# Patient Record
Sex: MALE | Race: WHITE | NOT HISPANIC OR LATINO | Employment: FULL TIME | URBAN - METROPOLITAN AREA
[De-identification: names, ages, dates, MRNs, and addresses within clinical notes are randomized per-mention and may not be internally consistent; named-entity substitution may affect disease eponyms.]

---

## 2017-02-17 ENCOUNTER — GENERIC CONVERSION - ENCOUNTER (OUTPATIENT)
Dept: OTHER | Facility: OTHER | Age: 52
End: 2017-02-17

## 2017-03-10 ENCOUNTER — ALLSCRIPTS OFFICE VISIT (OUTPATIENT)
Dept: OTHER | Facility: OTHER | Age: 52
End: 2017-03-10

## 2017-03-15 ENCOUNTER — GENERIC CONVERSION - ENCOUNTER (OUTPATIENT)
Dept: OTHER | Facility: OTHER | Age: 52
End: 2017-03-15

## 2017-03-15 RX ORDER — MULTIVITAMIN
1 CAPSULE ORAL DAILY
COMMUNITY

## 2017-03-16 LAB
ENDOMYSIAL IGA ANTIBODY (HISTORICAL): NEGATIVE
IGA (HISTORICAL): 367 MG/DL (ref 90–386)
TTG IGA (HISTORICAL): <2 U/ML (ref 0–3)

## 2017-03-17 LAB — H PYLORI ANTIGEN STOOL (HISTORICAL): NEGATIVE

## 2017-03-19 ENCOUNTER — ANESTHESIA EVENT (OUTPATIENT)
Dept: PERIOP | Facility: HOSPITAL | Age: 52
End: 2017-03-19
Payer: COMMERCIAL

## 2017-03-20 ENCOUNTER — HOSPITAL ENCOUNTER (OUTPATIENT)
Facility: HOSPITAL | Age: 52
Setting detail: OUTPATIENT SURGERY
Discharge: HOME | End: 2017-03-20
Attending: INTERNAL MEDICINE | Admitting: INTERNAL MEDICINE
Payer: COMMERCIAL

## 2017-03-20 ENCOUNTER — GENERIC CONVERSION - ENCOUNTER (OUTPATIENT)
Dept: OTHER | Facility: OTHER | Age: 52
End: 2017-03-20

## 2017-03-20 ENCOUNTER — ANESTHESIA (OUTPATIENT)
Dept: PERIOP | Facility: HOSPITAL | Age: 52
End: 2017-03-20
Payer: COMMERCIAL

## 2017-03-20 VITALS
WEIGHT: 209.44 LBS | SYSTOLIC BLOOD PRESSURE: 135 MMHG | HEART RATE: 76 BPM | TEMPERATURE: 97.6 F | DIASTOLIC BLOOD PRESSURE: 93 MMHG | OXYGEN SATURATION: 100 % | RESPIRATION RATE: 16 BRPM | BODY MASS INDEX: 28.37 KG/M2 | HEIGHT: 72 IN

## 2017-03-20 DIAGNOSIS — R10.9 ABDOMINAL PAIN: ICD-10-CM

## 2017-03-20 DIAGNOSIS — R14.0 ABDOMINAL BLOATING: ICD-10-CM

## 2017-03-20 DIAGNOSIS — R19.5 PASSAGE OF LOOSE STOOLS: ICD-10-CM

## 2017-03-20 DIAGNOSIS — K21.9 CHRONIC GERD: ICD-10-CM

## 2017-03-20 DIAGNOSIS — Z86.010 HISTORY OF COLONIC POLYPS: ICD-10-CM

## 2017-03-20 PROCEDURE — 88305 TISSUE EXAM BY PATHOLOGIST: CPT | Performed by: INTERNAL MEDICINE

## 2017-03-20 RX ORDER — PROPOFOL 10 MG/ML
INJECTION, EMULSION INTRAVENOUS AS NEEDED
Status: DISCONTINUED | OUTPATIENT
Start: 2017-03-20 | End: 2017-03-20 | Stop reason: SURG

## 2017-03-20 RX ORDER — PANTOPRAZOLE SODIUM 40 MG/1
40 TABLET, DELAYED RELEASE ORAL DAILY
COMMUNITY
End: 2018-04-06 | Stop reason: SDUPTHER

## 2017-03-20 RX ORDER — SODIUM CHLORIDE, SODIUM LACTATE, POTASSIUM CHLORIDE, CALCIUM CHLORIDE 600; 310; 30; 20 MG/100ML; MG/100ML; MG/100ML; MG/100ML
125 INJECTION, SOLUTION INTRAVENOUS CONTINUOUS
Status: DISCONTINUED | OUTPATIENT
Start: 2017-03-20 | End: 2017-03-20 | Stop reason: HOSPADM

## 2017-03-20 RX ADMIN — PROPOFOL 100 MG: 10 INJECTION, EMULSION INTRAVENOUS at 09:44

## 2017-03-20 RX ADMIN — PROPOFOL 50 MG: 10 INJECTION, EMULSION INTRAVENOUS at 09:48

## 2017-03-20 RX ADMIN — SODIUM CHLORIDE, SODIUM LACTATE, POTASSIUM CHLORIDE, AND CALCIUM CHLORIDE 125 ML/HR: .6; .31; .03; .02 INJECTION, SOLUTION INTRAVENOUS at 09:19

## 2018-01-11 NOTE — CONSULTS
I had the pleasure of evaluating your patient, Shiraz Fairchild  My full evaluation follows:      Chief Complaint  Abdominal discomfort      History of Present Illness  66-year-old male who has a long history of abdominal discomfort  He underwent EGD and colonoscopy in 2007 and was found to have gastritis  The patient also was apparently diagnosed with Helicobacter  He was treated but had apparently a recurrence  He is been on several regimens  He does not recall what they are  He also started having significant loose stool  He was placed on viberzi but did not feel it was helping and stopped taking it  His main problem is he has a bowel movement in the morning and then has urgency at night with abdominal bloating  He has not had any blood in the stool  Colonoscopy 3 years ago revealed colon polyps  He had an EGD 1 year ago but does not recall the results  He has no weight loss fever or chills  No dysphagia  He has a history of chronic GERD and was on pantoprazole for many years  His previous physician did not renew this  He is been taking over-the-counter medications and basically is asymptomatic from this point of view  He has no other exacerbating or remitting factor for his problems except possibly for stress  He works as a   He is actually been treated for stress with Xanax by psychiatrist in the past  He has no weight loss  No melanotic stool  No rectal bleeding  No nocturnal symptomatology  No exertional symptomatology  No relationship between food and his symptomatology  He does not take in a lot of lactose  No family history of any GI disease  No other complaints at the present time  Active Problems    1  Abdominal pain (789 00) (R10 9)    Past Medical History    The active problems and past medical history were reviewed and updated today  Surgical History    · History of Knee Surgery    The surgical history was reviewed and updated today         Family History    · Family history of coronary artery disease (V17 3) (Z82 49)    · Family history of malignant neoplasm of cervix (V16 49) (Z80 49)    The family history was reviewed and updated today  Social History    · Current some day smoker (305 1) (F17 200)   · Social alcohol use (Z78 9)  The social history was reviewed and updated today  The social history was reviewed and is unchanged  Current Meds   1  Multivitamin TABS; Therapy: (Recorded:10Mar2017) to Recorded    Allergies    1  No Known Drug Allergies    Vitals   Recorded: [id number] 97:19TE   Systolic 876   Diastolic 80   Height 5 ft 10 in   Weight 210 lb    BMI Calculated 30 13   BSA Calculated 2 13     Physical Exam    Constitutional   General appearance: No acute distress, well appearing and well nourished  Eyes   Conjunctiva and lids: No swelling, erythema, or discharge  Pupils and irises: Equal, round and reactive to light  Ears, Nose, Mouth, and Throat   External inspection of ears and nose: Normal     Nasal mucosa, septum, and turbinates: Normal without edema or erythema  Oropharynx: Normal with no erythema, edema, exudate or lesions  Pulmonary   Respiratory effort: No increased work of breathing or signs of respiratory distress  Auscultation of lungs: Clear to auscultation, equal breath sounds bilaterally, no wheezes, no rales, no rhonci  Cardiovascular   Palpation of heart: Normal PMI, no thrills  Auscultation of heart: Normal rate and rhythm, normal S1 and S2, without murmurs  Examination of extremities for edema and/or varicosities: Normal     Carotid pulses: Normal     Abdomen   Abdomen: Non-tender, no masses  Liver and spleen: No hepatomegaly or splenomegaly  Lymphatic   Palpation of lymph nodes in neck: No lymphadenopathy  Musculoskeletal   Gait and station: Normal     Digits and nails: Normal without clubbing or cyanosis      Inspection/palpation of joints, bones, and muscles: Normal     Skin   Skin and subcutaneous tissue: Normal without rashes or lesions  Neurologic No nystagmus or asterixis  Psychiatric   Orientation to person, place and time: Normal     Mood and affect: Normal          Assessment    1  Abdominal pain (789 00) (R10 9)   2  Abdominal bloating (787 3) (R14 0)   3  Chronic GERD (530 81) (K21 9)   4  History of colon polyps (V12 72) (Z86 010)   5  Passage of loose stools (787 91) (R19 7)    Plan  Abdominal bloating    · (1) CELIAC DISEASE AB PROFILE; Status:Active; Requested for:10Mar2017;    Perform:[facility]; Due:10Mar2018; Ordered; For:Abdominal bloating; Ordered By:Maycol Weathers;  Abdominal bloating, Abdominal pain    · (LC) H  pylori Stool Ag, EIA; Status:Active; Requested for:10Mar2017;    Perform:[facility]; Due:10Mar2018; Ordered; For:Abdominal bloating, Abdominal pain; Ordered By:Maycol Weathers;  Abdominal bloating, Abdominal pain, History of colon polyps, Passage of loose stools    · ENDOSCOPY - PROCEDURE, RISKS, AND BENEFITS; Status:Complete;   Done:  [id number]   Ordered; For:Abdominal bloating, Abdominal pain, History of colon polyps, Passage of loose stools; Ordered By:Maycol Weathers;  Abdominal bloating, Passage of loose stools    · COLONOSCOPY; Status:Active; Requested for:10Mar2017;    Perform:[facility]; Due:10Mar2018; Ordered; For:Abdominal bloating, Passage of loose stools; Ordered By:Maycol Weathers;  Chronic GERD    · Pantoprazole Sodium 40 MG Oral Tablet Delayed Release; take 1 tablet by mouth  every day   Rx By: Vishal Lebron; Dispense: 30 Days ; #:30 Tablet Delayed Release; Refill: 5; For: Chronic GERD; INDRA = N; Verified Transmission to [facility] #[id number]; Last Updated By: System, SureScript[employer]; 3/10/2017 8:45:39 AM    Discussion/Summary    Problem #1, chronic GERD, history of H  pylori which may have been refractory  Plan, stool for H  pylori antigen  Renew Protonix  Problem #2, altered bowel habits with abdominal bloating, loose stool, history of colon polyps   Plan, celiac panel  Repeat colonoscopy  Last colonoscopy 3 years ago  Problem #3, possible IBS  Patient is on Xanax for stress  He takes this when necessary  We will have him take it nightly for 5 nights to see if it has any effect on his bowel habits and abdominal bloating  Thank you very much for allowing me to participate in the care of this patient  If you have any questions, please do not hesitate to contact me        Signatures   Electronically signed by : RONN Nogueira ; Mar 10 2017  8:49AM EST                       (Author)

## 2018-01-12 NOTE — CONSULTS
Assessment    1  Abdominal pain (789 00) (R10 9)   2  Abdominal bloating (787 3) (R14 0)   3  Chronic GERD (530 81) (K21 9)   4  History of colon polyps (V12 72) (Z86 010)   5  Passage of loose stools (787 91) (R19 7)    Plan  Abdominal bloating    · (1) CELIAC DISEASE AB PROFILE; Status:Active; Requested for:10Mar2017;    Perform:[facility]; Due:10Mar2018; Ordered; For:Abdominal bloating; Ordered By:Maycol Weathers;  Abdominal bloating, Abdominal pain    · (LC) H  pylori Stool Ag, EIA; Status:Active; Requested for:10Mar2017;    Perform:[facility]; Due:10Mar2018; Ordered; For:Abdominal bloating, Abdominal pain; Ordered By:Maycol Weathers;  Abdominal bloating, Abdominal pain, History of colon polyps, Passage of loose stools    · ENDOSCOPY - PROCEDURE, RISKS, AND BENEFITS; Status:Complete;   Done:  [id number]   Ordered; For:Abdominal bloating, Abdominal pain, History of colon polyps, Passage of loose stools; Ordered By:Maycol Weathers;  Abdominal bloating, Passage of loose stools    · COLONOSCOPY; Status:Active; Requested for:10Mar2017;    Perform:[facility]; Due:10Mar2018; Ordered; For:Abdominal bloating, Passage of loose stools; Ordered By:Maycol Weathers;  Chronic GERD    · Pantoprazole Sodium 40 MG Oral Tablet Delayed Release; take 1 tablet by mouth  every day   Rx By: Larry Gan; Dispense: 30 Days ; #:30 Tablet Delayed Release; Refill: 5; For: Chronic GERD; INDRA = N; Verified Transmission to [facility]ARM[facility] #[id number]; Last Updated By: System, SureScripts; 3/10/2017 8:45:39 AM    Discussion/Summary  Discussion Summary:   Problem #1, chronic GERD, history of H  pylori which may have been refractory  Plan, stool for H  pylori antigen  Renew Protonix  Problem #2, altered bowel habits with abdominal bloating, loose stool, history of colon polyps  Plan, celiac panel  Repeat colonoscopy  Last colonoscopy 3 years ago  Problem #3, possible IBS  Patient is on Xanax for stress  He takes this when necessary  We will have him take it nightly for 5 nights to see if it has any effect on his bowel habits and abdominal bloating  Chief Complaint  Chief Complaint Free Text Note Form: Abdominal discomfort      History of Present Illness  HPI: 22-year-old male who has a long history of abdominal discomfort  He underwent EGD and colonoscopy in 2007 and was found to have gastritis  The patient also was apparently diagnosed with Helicobacter  He was treated but had apparently a recurrence  He is been on several regimens  He does not recall what they are  He also started having significant loose stool  He was placed on viberzi but did not feel it was helping and stopped taking it  His main problem is he has a bowel movement in the morning and then has urgency at night with abdominal bloating  He has not had any blood in the stool  Colonoscopy 3 years ago revealed colon polyps  He had an EGD 1 year ago but does not recall the results  He has no weight loss fever or chills  No dysphagia  He has a history of chronic GERD and was on pantoprazole for many years  His previous physician did not renew this  He is been taking over-the-counter medications and basically is asymptomatic from this point of view  He has no other exacerbating or remitting factor for his problems except possibly for stress  He works as a   He is actually been treated for stress with Xanax by psychiatrist in the past  He has no weight loss  No melanotic stool  No rectal bleeding  No nocturnal symptomatology  No exertional symptomatology  No relationship between food and his symptomatology  He does not take in a lot of lactose  No family history of any GI disease  No other complaints at the present time  Active Problems    1  Abdominal pain (789 00) (R10 9)    Past Medical History  Active Problems And Past Medical History Reviewed: The active problems and past medical history were reviewed and updated today  Surgical History    1   History of Knee Surgery  Surgical History Reviewed: The surgical history was reviewed and updated today  Family History  Father    1  Family history of coronary artery disease (V17 3) (Z82 49)  Sister    2  Family history of malignant neoplasm of cervix (V16 49) (Z80 49)  Family History Reviewed: The family history was reviewed and updated today  Social History    · Current some day smoker (305 1) (F17 200)   · Social alcohol use (Z78 9)  Social History Reviewed: The social history was reviewed and updated today  The social history was reviewed and is unchanged  Current Meds   1  Multivitamin TABS; Therapy: (Recorded:10Mar2017) to Recorded    Allergies    1  No Known Drug Allergies    Vitals  Vital Signs    Recorded: [id number] 60:68LP   Systolic 990   Diastolic 80   Height 5 ft 10 in   Weight 210 lb    BMI Calculated 30 13   BSA Calculated 2 13     Physical Exam    Constitutional   General appearance: No acute distress, well appearing and well nourished  Eyes   Conjunctiva and lids: No swelling, erythema, or discharge  Pupils and irises: Equal, round and reactive to light  Ears, Nose, Mouth, and Throat   External inspection of ears and nose: Normal     Nasal mucosa, septum, and turbinates: Normal without edema or erythema  Oropharynx: Normal with no erythema, edema, exudate or lesions  Pulmonary   Respiratory effort: No increased work of breathing or signs of respiratory distress  Auscultation of lungs: Clear to auscultation, equal breath sounds bilaterally, no wheezes, no rales, no rhonci  Cardiovascular   Palpation of heart: Normal PMI, no thrills  Auscultation of heart: Normal rate and rhythm, normal S1 and S2, without murmurs  Examination of extremities for edema and/or varicosities: Normal     Carotid pulses: Normal     Abdomen   Abdomen: Non-tender, no masses  Liver and spleen: No hepatomegaly or splenomegaly      Lymphatic   Palpation of lymph nodes in neck: No lymphadenopathy  Musculoskeletal   Gait and station: Normal     Digits and nails: Normal without clubbing or cyanosis  Inspection/palpation of joints, bones, and muscles: Normal     Skin   Skin and subcutaneous tissue: Normal without rashes or lesions  Neurologic No nystagmus or asterixis     Psychiatric   Orientation to person, place and time: Normal     Mood and affect: Normal          Signatures   Electronically signed by : RONN Stubbs ; Mar 10 2017  8:49AM EST                       (Author)

## 2018-01-14 VITALS
BODY MASS INDEX: 30.06 KG/M2 | SYSTOLIC BLOOD PRESSURE: 140 MMHG | HEIGHT: 70 IN | DIASTOLIC BLOOD PRESSURE: 80 MMHG | WEIGHT: 210 LBS

## 2018-04-06 DIAGNOSIS — K21.9 GASTROESOPHAGEAL REFLUX DISEASE WITHOUT ESOPHAGITIS: Primary | ICD-10-CM

## 2018-04-06 RX ORDER — PANTOPRAZOLE SODIUM 40 MG/1
40 TABLET, DELAYED RELEASE ORAL DAILY
Qty: 30 TABLET | Refills: 0 | Status: SHIPPED | OUTPATIENT
Start: 2018-04-06 | End: 2018-04-13

## 2018-04-06 RX ORDER — PANTOPRAZOLE SODIUM 40 MG/1
TABLET, DELAYED RELEASE ORAL
Qty: 30 TABLET | Refills: 5 | OUTPATIENT
Start: 2018-04-06

## 2018-04-10 ENCOUNTER — OFFICE VISIT (OUTPATIENT)
Dept: UROLOGY | Facility: MEDICAL CENTER | Age: 53
End: 2018-04-10
Payer: COMMERCIAL

## 2018-04-10 VITALS
WEIGHT: 193.8 LBS | BODY MASS INDEX: 26.25 KG/M2 | SYSTOLIC BLOOD PRESSURE: 146 MMHG | DIASTOLIC BLOOD PRESSURE: 84 MMHG | HEIGHT: 72 IN

## 2018-04-10 DIAGNOSIS — N40.0 BPH WITHOUT OBSTRUCTION/LOWER URINARY TRACT SYMPTOMS: ICD-10-CM

## 2018-04-10 DIAGNOSIS — N52.02 CORPORO-VENOUS OCCLUSIVE ERECTILE DYSFUNCTION: ICD-10-CM

## 2018-04-10 DIAGNOSIS — Z30.09 STERILIZATION CONSULT: Primary | ICD-10-CM

## 2018-04-10 DIAGNOSIS — N52.1 ERECTILE DYSFUNCTION DUE TO DISEASES CLASSIFIED ELSEWHERE: ICD-10-CM

## 2018-04-10 DIAGNOSIS — E29.1 HYPOGONADISM IN MALE: ICD-10-CM

## 2018-04-10 PROCEDURE — 99215 OFFICE O/P EST HI 40 MIN: CPT | Performed by: UROLOGY

## 2018-04-10 RX ORDER — HYDROCODONE POLISTIREX AND CHLORPHENIRAMINE POLISTIREX 10; 8 MG/5ML; MG/5ML
SUSPENSION, EXTENDED RELEASE ORAL
Refills: 0 | COMMUNITY
Start: 2018-01-08 | End: 2018-04-13

## 2018-04-10 RX ORDER — METOPROLOL SUCCINATE 25 MG/1
25 TABLET, EXTENDED RELEASE ORAL DAILY
Refills: 5 | Status: ON HOLD | COMMUNITY
Start: 2018-03-02 | End: 2021-12-12 | Stop reason: ALTCHOICE

## 2018-04-10 RX ORDER — ALPRAZOLAM 0.5 MG/1
TABLET ORAL
Refills: 1 | COMMUNITY
Start: 2018-04-03

## 2018-04-10 RX ORDER — OSELTAMIVIR PHOSPHATE 75 MG/1
CAPSULE ORAL
Refills: 0 | COMMUNITY
Start: 2018-01-08 | End: 2018-04-13

## 2018-04-10 RX ORDER — SILDENAFIL CITRATE 20 MG/1
100 TABLET ORAL AS NEEDED
Qty: 90 TABLET | Refills: 5 | Status: SHIPPED | OUTPATIENT
Start: 2018-04-10 | End: 2019-07-31 | Stop reason: SDUPTHER

## 2018-04-10 NOTE — LETTER
April 10, 2018     Huttonrodrigo IniguezlesDO  [address]  [address]  [place] [address]    Patient: Robert Troncoso   YOB: 1965   Date of Visit: 4/10/2018       Dear Dr Luz Berry: Thank you for referring Casey Ring to me for evaluation  Below are my notes for this consultation  If you have questions, please do not hesitate to call me  I look forward to following your patient along with you  Sincerely,        Mariusz Del Cid MD        CC: No Recipients  Mariusz Del Cid MD  4/10/2018  3:21 PM  Sign at close encounter  Assessment/Plan:  1  Elective sterilization-patient has 1 child and he is now 45years of age in wants to be sterilize  He was made aware of the possibility of bleeding infection recanalization persistent or recurrent fertility and pain syndrome  He requests elective vasectomy in this will be scheduled  2   BPH without obstructive symptoms  PSA testing is due and the patient will undergo that prior to his next office visit after vasectomy  3   Testicular hypogonadism  The patient has been kept on testosterone IM injection 200 mg every month  The patient is unsure as to what his initial testosterone prior to replacement therapy  The patient's testosterone was given this past month and will be held for another 6 weeks at which time blood work will be obtained and consideration towards continued therapy either with IM testosterone or topical testosterone will be made  The patient is aware of the possible complications of testosterone therapy including BPH exacerbation of any pre-existing prostate cancer polycythemia blood clots cardiac events  4   Erectile dysfunction due to veno-occlusive dysfunction  The patient does well on Viagra 100 mg and requested a generic prescription  No problem-specific Assessment & Plan notes found for this encounter  Diagnoses and all orders for this visit:    Sterilization consult  -     Vasectomy;  Future  -     Semen analysis, post-vasectomy; Future  -     Semen analysis, post-vasectomy; Future    BPH without obstruction/lower urinary tract symptoms  -     PSA Total, Diagnostic; Future    Hypogonadism in male  -     Comprehensive metabolic panel; Future  -     Testosterone, free, total; Future  -     Prolactin; Future    Other orders  -     Mometasone Furo-Formoterol Fum (DULERA IN); Inhale          Subjective:      Patient ID: Brittney Griffith is a 45 y o  male  HPI 61-year-old male followed by Dr Ghazala Alejo previously for low testosterone levels treated with IM testosterone 200 mg monthly, presents requesting elective sterilization in the form of the cystectomy  The patient was made aware of the possible side effects and complications including recurrent or persistent fertility and pain syndrome  The patient agrees the procedure and vasectomy will be scheduled  With regard to his prostate enlargement PSA testing will be obtained along with testosterone evaluation off of testosterone replacement therapy  The patient did request being followed at the [facility] office after vasectomy due to proximity of his residence  The following portions of the patient's history were reviewed and updated as appropriate: allergies, current medications, past family history, past medical history, past social history, past surgical history and problem list     Review of Systems   Gastrointestinal: Positive for abdominal distention, abdominal pain and diarrhea  Several colonoscopy   Endocrine: Negative  Genitourinary: Negative  Musculoskeletal: Negative  Neurological: Negative  Psychiatric/Behavioral: Negative  All other systems reviewed and are negative  Objective:      /84 (BP Location: Right arm, Patient Position: Sitting, Cuff Size: Standard)   Ht 6' (1 829 m)   Wt 87 9 kg (193 lb 12 8 oz)   BMI 26 28 kg/m²           Physical Exam   Constitutional: He is oriented to person, place, and time   He appears well-developed and well-nourished  No distress  HENT:   Head: Normocephalic and atraumatic  Eyes: EOM are normal    Neck: Neck supple  Pulmonary/Chest: Effort normal  No respiratory distress  Abdominal: Soft  He exhibits no distension  Genitourinary:   Genitourinary Comments: Phallus normal circumcised without cutaneous lesions  Meatus patent and normally placed  There is no evidence of any palpable Peyronie's plaques  Scrotum normal without cutaneous lesions  Testes and adnexa are completely within normal limits  Perineum normal   GT good anal sphincter tone normal anal verge prostate 30 g a nodular nontender    The patient has a small right inguinal hernia that is palpable  Musculoskeletal: Normal range of motion  Neurological: He is alert and oriented to person, place, and time  Psychiatric: He has a normal mood and affect   His behavior is normal  Judgment and thought content normal

## 2018-04-10 NOTE — PATIENT INSTRUCTIONS
Vasectomy   WHAT YOU NEED TO KNOW:   A vasectomy is a procedure to make you sterile  It is a permanent form of birth control  The vas deferens (sperm tubes) are cut so that the semen does not contain sperm  HOW TO PREPARE:   Before your procedure:   · Write down the correct date, time, and location of your procedure  · Arrange a ride home  Ask a family member or friend to drive you home after your surgery or procedure  Do not drive yourself home  · Ask your caregiver if you need to stop using aspirin or any other prescribed or over-the-counter medicine before your procedure or surgery  · Bring your medicine bottles or a list of your medicines when you see your caregiver  Tell your caregiver if you are allergic to any medicine  Tell your caregiver if you use any herbs, food supplements, or over-the-counter medicine  The night before your procedure:  Ask caregivers about directions for eating and drinking  The day of your procedure:   · Ask your caregiver before taking any medicine on the day of your procedure  These medicines include insulin, diabetic pills, high blood pressure pills, or heart pills  Bring a list of all the medicines you take, or your pill bottles, with you to the hospital     · You or a close family member will be asked to sign a legal document called a consent form  It gives caregivers permission to do the procedure or surgery  It also explains the problems that may happen, and your choices  Make sure all your questions are answered before you sign this form  · Caregivers may insert an intravenous tube (IV) into your vein  A vein in the arm is usually chosen  Through the IV tube, you may be given liquids and medicine  · An anesthesiologist will talk to you before your surgery  You may need medicine to keep you asleep or numb an area of your body during surgery   Tell caregivers if you or anyone in your family has had a problem with anesthesia in the past     · Bring an athletic supporter to wear after your procedure  WHAT WILL HAPPEN:   What will happen: An incision will be made on one side of your scrotum or down the middle  One of your sperm tubes will be pulled through the incision  Your surgeon will cut the sperm tube and remove a small portion of it  He may then close one or both ends with stitches or a heat treatment  He also may sew a piece of body tissue between the cut ends of your tubes  Your surgeon will then do the same procedure to your other sperm tube  Your incisions may be closed with stitches, tissue glue, or left open to heal  Germ-fighting medicine may be put on your scrotum, and the area will be covered with a bandage  After your procedure: You will be taken to a room to rest until you are fully awake  Healthcare providers will monitor you closely for any problems  Healthcare providers may apply ice and an athletic supporter to decrease swelling and bruising  Do not get out of bed until your healthcare provider says it is okay  When your healthcare provider sees that you are okay, you will be able to go home or be taken to your hospital room  CONTACT YOUR HEALTHCARE PROVIDER IF:   · You cannot make it to your procedure  · You have a fever  · You get a cold or the flu  · You have questions or concerns about your procedure  RISKS:   You may bleed more than expected  Your scrotum may be bruised or inflamed  You may get a wound, urinary tract, or epididymal infection  The epididymis is a long, curled tube on the back of your scrotum  You may feel pain when you have an erection  Granulomas may form if sperm leaks out of your cut sperm tube  Granulomas are a lump that forms under your skin  You may not become sterile if one or both of your cut sperm tubes grow back together  CARE AGREEMENT:   You have the right to help plan your care  Learn about your health condition and how it may be treated   Discuss treatment options with your caregivers to decide what care you want to receive  You always have the right to refuse treatment  © 2017 [address] [employer] Information is for End User's use only and may not be sold, redistributed or otherwise used for commercial purposes  All illustrations and images included in CareNotes® are the copyrighted property of [employer]  or [address]  The above information is an  only  It is not intended as medical advice for individual conditions or treatments  Talk to your doctor, nurse or pharmacist before following any medical regimen to see if it is safe and effective for you  Vasectomy   WHAT YOU NEED TO KNOW:   A vasectomy is a procedure to make you sterile  It is a permanent form of birth control  The vas deferens (sperm tubes) are cut so that the semen does not contain sperm  DISCHARGE INSTRUCTIONS:   Medicines: You may need any of the following:  · NSAIDs  help decrease swelling, pain, and fever  This medicine can be bought with or without a doctor's order  This medicine can cause stomach bleeding or kidney problems in certain people  If you take blood thinner medicine, always ask your healthcare provider if NSAIDs are safe for you  Always read the medicine label and follow the directions on it before using this medicine  · Take your medicine as directed  Contact your healthcare provider if you think your medicine is not helping or if you have side effects  Tell him if you are allergic to any medicine  Keep a list of the medicines, vitamins, and herbs you take  Include the amounts, and when and why you take them  Bring the list or the pill bottles to follow-up visits  Carry your medicine list with you in case of an emergency  Decrease pain and swelling:   · Lie on your back  as much as possible the day of your procedure  Place a cushion such as a washcloth or small towel under your scrotum to elevate it  · Apply ice  on your scrotum for 15 to 20 minutes every hour for 2 days  Use an ice pack, or put crushed ice in a plastic bag  Cover it with a towel  Ice helps prevent tissue damage and decreases swelling and pain  · Wear an athletic supporter for at least 2 days  This will decrease pain and swelling, and protect your wound  Wound care:  Care for your wound as directed  Carefully wash the wound with soap and water  Dry the area and put on new, clean bandages as directed  Change your bandages when they get wet or dirty  Activity:  You may walk and drive normally the day after your procedure  Do not play sports, do yard work, or lift anything heavy until your healthcare provider says it is okay  You may need to wait up to a week before you have sex  Follow up with your healthcare provider or surgeon in 12 weeks: You will need to return to have your semen tested for sperm  Write down your questions so you remember to ask them during your visits  Contact your healthcare provider or surgeon if:   · You have a fever  · Your wound is red, swollen, or draining pus  · You feel pain or burning when you urinate  · You have worsening pain in your scrotum, even after you take medicine  · You have questions or concerns about your condition or care  Seek care immediately or call 911 if:   · Blood soaks through your bandage  · Your stitches come apart  · You see blood in your urine or semen  © 2017 [address] [employer] Information is for End User's use only and may not be sold, redistributed or otherwise used for commercial purposes  All illustrations and images included in CareNotes® are the copyrighted property of [employer]  or [employer]  The above information is an  only  It is not intended as medical advice for individual conditions or treatments  Talk to your doctor, nurse or pharmacist before following any medical regimen to see if it is safe and effective for you

## 2018-04-10 NOTE — PROGRESS NOTES
Assessment/Plan:  1  Elective sterilization-patient has 1 child and he is now 45years of age in wants to be sterilize  He was made aware of the possibility of bleeding infection recanalization persistent or recurrent fertility and pain syndrome  He requests elective vasectomy in this will be scheduled  2   BPH without obstructive symptoms  PSA testing is due and the patient will undergo that prior to his next office visit after vasectomy  3   Testicular hypogonadism  The patient has been kept on testosterone IM injection 200 mg every month  The patient is unsure as to what his initial testosterone prior to replacement therapy  The patient's testosterone was given this past month and will be held for another 6 weeks at which time blood work will be obtained and consideration towards continued therapy either with IM testosterone or topical testosterone will be made  The patient is aware of the possible complications of testosterone therapy including BPH exacerbation of any pre-existing prostate cancer polycythemia blood clots cardiac events  4   Erectile dysfunction due to veno-occlusive dysfunction  The patient does well on Viagra 100 mg and requested a generic prescription  No problem-specific Assessment & Plan notes found for this encounter  Diagnoses and all orders for this visit:    Sterilization consult  -     Vasectomy; Future  -     Semen analysis, post-vasectomy; Future  -     Semen analysis, post-vasectomy; Future    BPH without obstruction/lower urinary tract symptoms  -     PSA Total, Diagnostic; Future    Hypogonadism in male  -     Comprehensive metabolic panel; Future  -     Testosterone, free, total; Future  -     Prolactin; Future    Other orders  -     Mometasone Furo-Formoterol Fum (DULERA IN); Inhale          Subjective:      Patient ID: Josse Koroma is a 45 y o  male      HPI 57-year-old male followed by Dr Katelyn Urena previously for low testosterone levels treated with IM testosterone 200 mg monthly, presents requesting elective sterilization in the form of the cystectomy  The patient was made aware of the possible side effects and complications including recurrent or persistent fertility and pain syndrome  The patient agrees the procedure and vasectomy will be scheduled  With regard to his prostate enlargement PSA testing will be obtained along with testosterone evaluation off of testosterone replacement therapy  The patient did request being followed at the [place] office after vasectomy due to proximity of his residence  The following portions of the patient's history were reviewed and updated as appropriate: allergies, current medications, past family history, past medical history, past social history, past surgical history and problem list     Review of Systems   Gastrointestinal: Positive for abdominal distention, abdominal pain and diarrhea  Several colonoscopy   Endocrine: Negative  Genitourinary: Negative  Musculoskeletal: Negative  Neurological: Negative  Psychiatric/Behavioral: Negative  All other systems reviewed and are negative  Objective:      /84 (BP Location: Right arm, Patient Position: Sitting, Cuff Size: Standard)   Ht 6' (1 829 m)   Wt 87 9 kg (193 lb 12 8 oz)   BMI 26 28 kg/m²          Physical Exam   Constitutional: He is oriented to person, place, and time  He appears well-developed and well-nourished  No distress  HENT:   Head: Normocephalic and atraumatic  Eyes: EOM are normal    Neck: Neck supple  Pulmonary/Chest: Effort normal  No respiratory distress  Abdominal: Soft  He exhibits no distension  Genitourinary:   Genitourinary Comments: Phallus normal circumcised without cutaneous lesions  Meatus patent and normally placed  There is no evidence of any palpable Peyronie's plaques  Scrotum normal without cutaneous lesions  Testes and adnexa are completely within normal limits    Perineum normal  GT good anal sphincter tone normal anal verge prostate 30 g a nodular nontender    The patient has a small right inguinal hernia that is palpable  Musculoskeletal: Normal range of motion  Neurological: He is alert and oriented to person, place, and time  Psychiatric: He has a normal mood and affect   His behavior is normal  Judgment and thought content normal

## 2018-04-11 ENCOUNTER — OFFICE VISIT (OUTPATIENT)
Dept: GASTROENTEROLOGY | Facility: CLINIC | Age: 53
End: 2018-04-11
Payer: COMMERCIAL

## 2018-04-11 VITALS
BODY MASS INDEX: 26.14 KG/M2 | WEIGHT: 193 LBS | DIASTOLIC BLOOD PRESSURE: 90 MMHG | SYSTOLIC BLOOD PRESSURE: 130 MMHG | HEIGHT: 72 IN | HEART RATE: 72 BPM

## 2018-04-11 DIAGNOSIS — K21.9 GASTROESOPHAGEAL REFLUX DISEASE, ESOPHAGITIS PRESENCE NOT SPECIFIED: Primary | ICD-10-CM

## 2018-04-11 DIAGNOSIS — K58.0 IRRITABLE BOWEL SYNDROME WITH DIARRHEA: ICD-10-CM

## 2018-04-11 PROCEDURE — 99214 OFFICE O/P EST MOD 30 MIN: CPT | Performed by: PHYSICIAN ASSISTANT

## 2018-04-11 RX ORDER — FAMOTIDINE 40 MG/1
40 TABLET, FILM COATED ORAL DAILY
Qty: 90 TABLET | Refills: 1 | Status: SHIPPED | OUTPATIENT
Start: 2018-04-11 | End: 2018-09-18 | Stop reason: SDUPTHER

## 2018-04-11 NOTE — PROGRESS NOTES
[address] [facility]  Janell Landers 45 y o  male MRN: [id number]       CC: Follow-up, last seen last year    HPI: Shivam Wong is a 45year old male who presents for follow up for chronic GERD and IBS-D  Patient reports from the reflux standpoint, he does not have reflux symptoms with taking once daily Protonix in the morning  He was previously having heartburn and belching  He denies odynophagia or dysphagia  He denies unintentional weight loss  With regard to his lower GI symptoms, patient continues to have loose bowel movements more than half the week  Celiac panel negative  He does seem to see worsening symptoms with dairy  He even trailed gluten free foods  He previously trailed Viberzi some years ago, however he explains this made the consistency of his bowels worse  He is following a FODMAP diet  Patient relays he feels urgency at times following a meal  This is associated with lower abdominal cramping and bloating  He denies hematochezia or melena  He denies recent travel or antibiotic use  Last colonoscopy was in March 2017 for diarrhea and history of polyps  Other than moderately severe diverticulosis, colonoscopy was normal  Biopsies were taken, which were negative for microscopic colitis  Last EGD was in 2014 with [address] [place] [address]  Biopsies at that time were negative for Newman's esophagus  He does have history of candida esophagitis  Review of Systems:    CONSTITUTIONAL: Denies any fever, chills, or rigors  HEENT: No earache or tinnitus  Denies hearing loss or visual disturbances  CARDIOVASCULAR: No chest pain or palpitations  RESPIRATORY: Denies any cough, hemoptysis, shortness of breath or dyspnea on exertion  GASTROINTESTINAL: As noted in the History of Present Illness  GENITOURINARY: No problems with urination  Denies any hematuria or dysuria  NEUROLOGIC: No dizziness or vertigo, denies headaches  MUSCULOSKELETAL: Denies any muscle or joint pain     SKIN: Denies skin rashes or itching  ENDOCRINE: Denies excessive thirst  Denies intolerance to heat or cold  Current Outpatient Prescriptions   Medication Sig Dispense Refill    ALPRAZolam (XANAX) 0 5 mg tablet TAKE 1 TAB TWICE DAILY AS NEEDED  1    hydrocodone-chlorpheniramine polistirex (TUSSIONEX) 10-8 mg/5 mL ER suspension TAKE 5ML BY MOUTH EVERY 12 HOURS AS NEEDED FOR COUGH  0    metoprolol succinate (TOPROL-XL) 25 mg 24 hr tablet Take 25 mg by mouth daily  5    Mometasone Furo-Formoterol Fum (DULERA IN) Inhale      Multiple Vitamin (MULTIVITAMIN) capsule Take 1 capsule by mouth daily      oseltamivir (TAMIFLU) 75 mg capsule 1 CAPSULE(S) BY MOUTH EVERY 12 HOURS AROUND THE CLOCK  0    pantoprazole (PROTONIX) 40 mg tablet Take 1 tablet (40 mg total) by mouth daily 30 tablet 0    sildenafil (REVATIO) 20 mg tablet Take 5 tablets (100 mg total) by mouth as needed (Erectile dysfunction) 90 tablet 5     No current facility-administered medications for this visit  Past Medical History:   Diagnosis Date    Asthma     Erectile dysfunction     IBS (irritable bowel syndrome)      Past Surgical History:   Procedure Laterality Date    KNEE SURGERY      OR COLONOSCOPY FLX DX W/COLLJ SPEC WHEN PFRMD N/A 3/20/2017    Procedure: COLONOSCOPY;  Surgeon: Nasra Garcia MD;  Location: [facility];   Service: Gastroenterology    TONSILLECTOMY       Social History     Social History    Marital status:      Spouse name: N/A    Number of children: N/A    Years of education: N/A     Occupational History    Retired      Social History Main Topics    Smoking status: Former Smoker     Quit date: 10/2016    Smokeless tobacco: Never Used    Alcohol use Yes      Comment: OCCASSIONAL    Drug use: No    Sexual activity: Not on file     Other Topics Concern    Not on file     Social History Narrative    No narrative on file     Family History   Problem Relation Age of Onset    Diabetes Father     Heart disease Father  Hypertension Father             PHYSICAL EXAM:    There were no vitals filed for this visit  General Appearance:   Alert and oriented x 3  Cooperative, and in no respiratory distress   HEENT:   Normocephalic, atraumatic, anicteric      Neck:  Supple, symmetrical, trachea midline   Lungs:   Clear to auscultation bilaterally; no rales, rhonchi or wheezing; respirations unlabored    Heart[de-identified]   Regular rate and rhythm   Abdomen:   Soft, non-tender, non-distended; normal bowel sounds; no masses, no organomegaly    Genitalia:   Deferred    Rectal:   Deferred    Extremities:  No cyanosis, clubbing or edema    Pulses:  2+ and symmetric all extremities    Skin:  Skin color, texture, turgor normal, no rashes or lesions    Lymph nodes:  No palpable cervical or supraclavicular lymphadenopathy          ASSESSMENT and PLAN:      1) GERD - Patient is doing well with once daily Protonix  He is interested in weaning down from acid suppression medication  Explained to patient that he will take Protonix every other day for one week, while reintroducing famotidine at bedtime  The following week, decrease every other day etc    - At next follow-up will reassess  - Famotidine ordered   - Explained to patient that he develops alarm symptoms, EGD would be performed     2) IBS-D - Patient had some improvement with FODMAP diet, however is still having loose bowel movements  He has failed Viberzi in the past  His recent colonoscopy was negative for microscopic colitis  - Xifaxan for 2 weeks   - Will reassess at next visit   - Continue FODMAP diet       Follow up in 1 month

## 2018-04-13 ENCOUNTER — OFFICE VISIT (OUTPATIENT)
Dept: PULMONOLOGY | Facility: MEDICAL CENTER | Age: 53
End: 2018-04-13
Payer: COMMERCIAL

## 2018-04-13 VITALS
WEIGHT: 192 LBS | OXYGEN SATURATION: 96 % | TEMPERATURE: 97.6 F | RESPIRATION RATE: 16 BRPM | SYSTOLIC BLOOD PRESSURE: 110 MMHG | HEART RATE: 72 BPM | BODY MASS INDEX: 26.01 KG/M2 | DIASTOLIC BLOOD PRESSURE: 70 MMHG | HEIGHT: 72 IN

## 2018-04-13 DIAGNOSIS — Z87.891 FORMER SMOKER: ICD-10-CM

## 2018-04-13 DIAGNOSIS — J45.20 MILD INTERMITTENT ASTHMA WITHOUT COMPLICATION: ICD-10-CM

## 2018-04-13 DIAGNOSIS — G47.33 OSA (OBSTRUCTIVE SLEEP APNEA): Primary | ICD-10-CM

## 2018-04-13 PROCEDURE — 99204 OFFICE O/P NEW MOD 45 MIN: CPT | Performed by: INTERNAL MEDICINE

## 2018-04-13 NOTE — PROGRESS NOTES
Assessment/Plan:       Problem List Items Addressed This Visit        Respiratory    KWAKU (obstructive sleep apnea) - Primary     Patient's underlying risk factors are reviewed  Underlying pathophysiology and risks of untreated sleep apnea were discussed in detail  We did discuss the detail treatment options including CPAP therapy with new mask that may be more comfortable for him  He has decided that he will undergo sleep testing and CPAP simultaneously with evaluation for dental device and see which is most comfortable for him  Will obtain old CPAP studies for review  Avoidance of sedatives, supine sleep, alcohol, narcotics in the setting of untreated sleep apnea is discussed  Absolute avoidance of driving while drowsy is also discussed  Avoid weight gain/encourage weight loss  Relevant Orders    Home Study    Mild intermittent asthma without complication     This is stable on Dulera  Other    Former smoker             Follow-up after sleep study  All questions are answered to the patient's satisfaction and understanding  He verbalizes understanding  He is encouraged to call with any further questions or concerns  Portions of the record may have been created with voice recognition software  Occasional wrong word or "sound a like" substitutions may have occurred due to the inherent limitations of voice recognition software  Read the chart carefully and recognize, using context, where substitutions have occurred  ______________________________________________________________________    Chief Complaint:   Chief Complaint   Patient presents with    Sleeping Problem     Had sleep study 20 years ago cannot tolerate CPAP    Snoring     Witnessed        Patient ID: Nurys Prather is a 45 y o  y o  male has a past medical history of Asthma; Erectile dysfunction; and IBS (irritable bowel syndrome)      4/13/2018  Patient presents today for initial visit for re-evaluation for sleep apnea  He has been evaluated for sleep apnea multiple times in the past and has tried CPAP twice but felt like he could not tolerate it  He has a friend who has been fitted for a dental device and he feels like this may be an option for him  Did try using the CPAP but could not undergo the process of placing  The mask on every night  He was able to sleep with it  Sometimes he has trouble sleeping because his mind does not stop and then he takes Xanax to go to sleep and this keeps him asleep through out the night  Wakes up once due to he is unsure- ?to use the restroom  Only sleeps about 5-6 hours  He cant go back to asleep  This has been for years  Atleast 10 years  +snoring  +gasping at night    +daytime fatigue  Does not nap  He goes to bed around 11:30 p m  and falls asleep by 12:00 a m  He is out of bed at 6:30 a m  He does feel sleepy towards the afternoon  He does snore, choking gasping in his sleep and there is history of witnessed apneas  [place] Sleepiness Scale today is 14/24  No abnormal movements in his sleep  No sleep walking, sleep talking or falling out of bed  No RLS symptoms are noted  No cataplexy  No sleep hallucinations or sleep paralysis is reported  Patient drinks about 18 oz of coffee the per day  No recent weight changes    Diagnosed with asthma since childhood then resolved and then recurred 3-4 years ago- uses [facility]  Review of Systems   All other systems reviewed and are negative  Social history: He reports that he quit smoking about 18 months ago  He has a 30 00 pack-year smoking history  He has never used smokeless tobacco  He reports that he drinks alcohol  He reports that he does not use drugs      Past surgical history:   Past Surgical History:   Procedure Laterality Date    KNEE SURGERY      SD COLONOSCOPY FLX DX W/COLLJ SPEC WHEN PFRMD N/A 3/20/2017    Procedure: COLONOSCOPY;  Surgeon: Stanislaw Neil MD;  Location: [facility]; Service: Gastroenterology    TONSILLECTOMY       Family history:   Family History   Problem Relation Age of Onset    Diabetes Father     Heart disease Father     Hypertension Father        Immunization History   Administered Date(s) Administered    Influenza 11/19/2015     Current Outpatient Prescriptions   Medication Sig Dispense Refill    ALPRAZolam (XANAX) 0 5 mg tablet TAKE 1 TAB TWICE DAILY AS NEEDED  1    famotidine (PEPCID) 40 MG tablet Take 1 tablet (40 mg total) by mouth daily for 90 days 90 tablet 1    metoprolol succinate (TOPROL-XL) 25 mg 24 hr tablet Take 25 mg by mouth daily  5    Mometasone Furo-Formoterol Fum (DULERA IN) Inhale      Multiple Vitamin (MULTIVITAMIN) capsule Take 1 capsule by mouth daily      sildenafil (REVATIO) 20 mg tablet Take 5 tablets (100 mg total) by mouth as needed (Erectile dysfunction) 90 tablet 5     No current facility-administered medications for this visit  Allergies: Patient has no known allergies  Objective:  Vitals:    04/13/18 0937   BP: 110/70   BP Location: Right arm   Patient Position: Sitting   Cuff Size: Standard   Pulse: 72   Resp: 16   Temp: 97 6 °F (36 4 °C)   TempSrc: Oral   SpO2: 96%   Weight: 87 1 kg (192 lb)   Height: 6' (1 829 m)   Oxygen Therapy  SpO2: 96 %    Wt Readings from Last 3 Encounters:   04/13/18 87 1 kg (192 lb)   04/11/18 87 5 kg (193 lb)   04/10/18 87 9 kg (193 lb 12 8 oz)     Body mass index is 26 04 kg/m²  Physical Exam   Constitutional: He is oriented to person, place, and time  He appears well-developed and well-nourished  No distress  HENT:   Head: Normocephalic and atraumatic  Mouth/Throat: Oropharynx is clear and moist  No oropharyngeal exudate  Mallampati 3   Eyes: EOM are normal  Pupils are equal, round, and reactive to light  Neck: Normal range of motion  Neck supple  No tracheal deviation present  No thyromegaly present  Neck circumference 15 5   Cardiovascular: Normal rate and regular rhythm  No murmur heard  Pulmonary/Chest: Effort normal and breath sounds normal  No respiratory distress  He has no wheezes  He has no rales  He exhibits no tenderness  Abdominal: Soft  Bowel sounds are normal  He exhibits no distension  There is no tenderness  Musculoskeletal: Normal range of motion  He exhibits no edema  Lymphadenopathy:     He has no cervical adenopathy  Neurological: He is alert and oriented to person, place, and time  No cranial nerve deficit  Skin: Skin is warm and dry  He is not diaphoretic  Psychiatric: He has a normal mood and affect  His behavior is normal    Vitals reviewed        Lab Review:   No results found for: NA, K, CL, CO2, BUN, CREATININE, GLUCOSE, CALCIUM  No results found for: WBC, HGB, HCT, MCV, PLT    Diagnostics:  No new diagnostics

## 2018-04-13 NOTE — LETTER
April 14, 2018     Chana Fajardo DO  [address] [facility]  [address]  [address] [place] [address]    Patient: Melchor Benavides   YOB: 1965   Date of Visit: 4/13/2018       Dear Dr Chaka Finley: Thank you for referring Isi Moran to me for evaluation  Below are my notes for this consultation  If you have questions, please do not hesitate to call me  I look forward to following your patient along with you  Sincerely,        Brigid Winchester MD        CC: No Recipients  Brigid Winchester MD  4/14/2018  5:45 AM  Sign at close encounter  Assessment/Plan:       Problem List Items Addressed This Visit        Respiratory    KWAKU (obstructive sleep apnea) - Primary     Patient's underlying risk factors are reviewed  Underlying pathophysiology and risks of untreated sleep apnea were discussed in detail  We did discuss the detail treatment options including CPAP therapy with new mask that may be more comfortable for him  He has decided that he will undergo sleep testing and CPAP simultaneously with evaluation for dental device and see which is most comfortable for him  Will obtain old CPAP studies for review  Avoidance of sedatives, supine sleep, alcohol, narcotics in the setting of untreated sleep apnea is discussed  Absolute avoidance of driving while drowsy is also discussed  Avoid weight gain/encourage weight loss  Relevant Orders    Home Study    Mild intermittent asthma without complication     This is stable on Dulera  Other    Former smoker             Follow-up after sleep study  All questions are answered to the patient's satisfaction and understanding  He verbalizes understanding  He is encouraged to call with any further questions or concerns  Portions of the record may have been created with voice recognition software  Occasional wrong word or "sound a like" substitutions may have occurred due to the inherent limitations of voice recognition software    Read the chart carefully and recognize, using context, where substitutions have occurred  ______________________________________________________________________    Chief Complaint:   Chief Complaint   Patient presents with    Sleeping Problem     Had sleep study 20 years ago cannot tolerate CPAP    Snoring     Witnessed        Patient ID: Tanna Hall is a 45 y o  y o  male has a past medical history of Asthma; Erectile dysfunction; and IBS (irritable bowel syndrome)  4/13/2018  Patient presents today for initial visit for re-evaluation for sleep apnea  He has been evaluated for sleep apnea multiple times in the past and has tried CPAP twice but felt like he could not tolerate it  He has a friend who has been fitted for a dental device and he feels like this may be an option for him  Did try using the CPAP but could not undergo the process of placing  The mask on every night  He was able to sleep with it  Sometimes he has trouble sleeping because his mind does not stop and then he takes Xanax to go to sleep and this keeps him asleep through out the night  Wakes up once due to he is unsure- ?to use the restroom  Only sleeps about 5-6 hours  He cant go back to asleep  This has been for years  Atleast 10 years  +snoring  +gasping at night    +daytime fatigue  Does not nap  He goes to bed around 11:30 p m  and falls asleep by 12:00 a m  He is out of bed at 6:30 a m  He does feel sleepy towards the afternoon  He does snore, choking gasping in his sleep and there is history of witnessed apneas  [place] Sleepiness Scale today is 14/24  No abnormal movements in his sleep  No sleep walking, sleep talking or falling out of bed  No RLS symptoms are noted  No cataplexy  No sleep hallucinations or sleep paralysis is reported  Patient drinks about 18 oz of coffee the per day        No recent weight changes    Diagnosed with asthma since childhood then resolved and then recurred 3-4 years ago- uses Dulera  Review of Systems   All other systems reviewed and are negative  Social history: He reports that he quit smoking about 18 months ago  He has a 30 00 pack-year smoking history  He has never used smokeless tobacco  He reports that he drinks alcohol  He reports that he does not use drugs  Past surgical history:   Past Surgical History:   Procedure Laterality Date    KNEE SURGERY      [place] COLONOSCOPY FLX DX W/COLLJ SPEC WHEN PFRMD N/A 3/20/2017    Procedure: COLONOSCOPY;  Surgeon: Atif Clancy MD;  Location: [facility]; Service: Gastroenterology    TONSILLECTOMY       Family history:   Family History   Problem Relation Age of Onset    Diabetes Father     Heart disease Father     Hypertension Father        Immunization History   Administered Date(s) Administered    Influenza 11/19/2015     Current Outpatient Prescriptions   Medication Sig Dispense Refill    ALPRAZolam (XANAX) 0 5 mg tablet TAKE 1 TAB TWICE DAILY AS NEEDED  1    famotidine (PEPCID) 40 MG tablet Take 1 tablet (40 mg total) by mouth daily for 90 days 90 tablet 1    metoprolol succinate (TOPROL-XL) 25 mg 24 hr tablet Take 25 mg by mouth daily  5    Mometasone Furo-Formoterol Fum (DULERA IN) Inhale      Multiple Vitamin (MULTIVITAMIN) capsule Take 1 capsule by mouth daily      sildenafil (REVATIO) 20 mg tablet Take 5 tablets (100 mg total) by mouth as needed (Erectile dysfunction) 90 tablet 5     No current facility-administered medications for this visit  Allergies: Patient has no known allergies  Objective:  Vitals:    04/13/18 0937   BP: 110/70   BP Location: Right arm   Patient Position: Sitting   Cuff Size: Standard   Pulse: 72   Resp: 16   Temp: 97 6 °F (36 4 °C)   TempSrc: Oral   SpO2: 96%   Weight: 87 1 kg (192 lb)   Height: 6' (1 829 m)   Oxygen Therapy  SpO2: 96 %      Wt Readings from Last 3 Encounters:   04/13/18 87 1 kg (192 lb)   04/11/18 87 5 kg (193 lb)   04/10/18 87 9 kg (193 lb 12 8 oz)     Body mass index is 26 04 kg/m²  Physical Exam   Constitutional: He is oriented to person, place, and time  He appears well-developed and well-nourished  No distress  HENT:   Head: Normocephalic and atraumatic  Mouth/Throat: Oropharynx is clear and moist  No oropharyngeal exudate  Mallampati 3   Eyes: EOM are normal  Pupils are equal, round, and reactive to light  Neck: Normal range of motion  Neck supple  No tracheal deviation present  No thyromegaly present  Neck circumference 15 5   Cardiovascular: Normal rate and regular rhythm  No murmur heard  Pulmonary/Chest: Effort normal and breath sounds normal  No respiratory distress  He has no wheezes  He has no rales  He exhibits no tenderness  Abdominal: Soft  Bowel sounds are normal  He exhibits no distension  There is no tenderness  Musculoskeletal: Normal range of motion  He exhibits no edema  Lymphadenopathy:     He has no cervical adenopathy  Neurological: He is alert and oriented to person, place, and time  No cranial nerve deficit  Skin: Skin is warm and dry  He is not diaphoretic  Psychiatric: He has a normal mood and affect  His behavior is normal    Vitals reviewed        Lab Review:   No results found for: NA, K, CL, CO2, BUN, CREATININE, GLUCOSE, CALCIUM  No results found for: WBC, HGB, HCT, MCV, PLT    Diagnostics:  No new diagnostics

## 2018-04-14 PROBLEM — G47.33 OSA (OBSTRUCTIVE SLEEP APNEA): Status: ACTIVE | Noted: 2018-04-14

## 2018-04-14 PROBLEM — J45.20 MILD INTERMITTENT ASTHMA WITHOUT COMPLICATION: Status: ACTIVE | Noted: 2018-04-14

## 2018-04-14 PROBLEM — Z87.891 FORMER SMOKER: Status: ACTIVE | Noted: 2018-04-14

## 2018-04-14 NOTE — ASSESSMENT & PLAN NOTE
Patient's underlying risk factors are reviewed  Underlying pathophysiology and risks of untreated sleep apnea were discussed in detail  We did discuss the detail treatment options including CPAP therapy with new mask that may be more comfortable for him  He has decided that he will undergo sleep testing and CPAP simultaneously with evaluation for dental device and see which is most comfortable for him  Will obtain old CPAP studies for review  Avoidance of sedatives, supine sleep, alcohol, narcotics in the setting of untreated sleep apnea is discussed  Absolute avoidance of driving while drowsy is also discussed  Avoid weight gain/encourage weight loss

## 2018-04-27 ENCOUNTER — HOSPITAL ENCOUNTER (OUTPATIENT)
Dept: SLEEP CENTER | Facility: CLINIC | Age: 53
Discharge: HOME | End: 2018-04-27
Payer: COMMERCIAL

## 2018-04-27 DIAGNOSIS — G47.33 OSA (OBSTRUCTIVE SLEEP APNEA): ICD-10-CM

## 2018-04-27 PROCEDURE — G0399 HOME SLEEP TEST/TYPE 3 PORTA: HCPCS

## 2018-05-01 PROCEDURE — G0399 HOME SLEEP TEST/TYPE 3 PORTA: HCPCS | Performed by: INTERNAL MEDICINE

## 2018-05-04 ENCOUNTER — TELEPHONE (OUTPATIENT)
Dept: PULMONOLOGY | Facility: MEDICAL CENTER | Age: 53
End: 2018-05-04

## 2018-05-06 DIAGNOSIS — K21.9 GASTROESOPHAGEAL REFLUX DISEASE WITHOUT ESOPHAGITIS: ICD-10-CM

## 2018-05-06 RX ORDER — PANTOPRAZOLE SODIUM 40 MG/1
40 TABLET, DELAYED RELEASE ORAL DAILY
Qty: 30 TABLET | Refills: 0 | Status: ON HOLD | OUTPATIENT
Start: 2018-05-06 | End: 2021-12-12 | Stop reason: ALTCHOICE

## 2018-05-07 ENCOUNTER — TELEPHONE (OUTPATIENT)
Dept: PULMONOLOGY | Facility: MEDICAL CENTER | Age: 53
End: 2018-05-07

## 2018-05-07 DIAGNOSIS — G47.33 OSA (OBSTRUCTIVE SLEEP APNEA): Primary | ICD-10-CM

## 2018-05-07 NOTE — TELEPHONE ENCOUNTER
Called patient and notified him of the results of his home study  He will need to undergo in-lab testing as this was nondiagnostic for obstructive sleep apnea

## 2018-05-09 ENCOUNTER — TELEPHONE (OUTPATIENT)
Dept: GASTROENTEROLOGY | Facility: CLINIC | Age: 53
End: 2018-05-09

## 2018-05-09 ENCOUNTER — OFFICE VISIT (OUTPATIENT)
Dept: GASTROENTEROLOGY | Facility: CLINIC | Age: 53
End: 2018-05-09
Payer: COMMERCIAL

## 2018-05-09 VITALS
HEIGHT: 72 IN | WEIGHT: 194 LBS | DIASTOLIC BLOOD PRESSURE: 80 MMHG | HEART RATE: 72 BPM | SYSTOLIC BLOOD PRESSURE: 130 MMHG | BODY MASS INDEX: 26.28 KG/M2

## 2018-05-09 DIAGNOSIS — K58.0 IRRITABLE BOWEL SYNDROME WITH DIARRHEA: Primary | ICD-10-CM

## 2018-05-09 DIAGNOSIS — K21.9 GASTROESOPHAGEAL REFLUX DISEASE WITHOUT ESOPHAGITIS: ICD-10-CM

## 2018-05-09 PROCEDURE — 99214 OFFICE O/P EST MOD 30 MIN: CPT | Performed by: PHYSICIAN ASSISTANT

## 2018-05-09 NOTE — PROGRESS NOTES
[facility]  Ross Organ 52 y o  male MRN: [id number]       CC: Follow-up     HPI: Falguni Reyes is a 45year old male who presents for follow-up  Patient was last seen in the office by me for GERD and IBS-D  At his last visit, we attempted to wean him off of his once daily PPI as he was not having reflux symptoms whatsoever  Pepcid was introduced  He reports after 4 weeks, he is doing well  He does not have reflux symptoms such as regurgitation or pyrosis  Patient does adhere to diet modifications  In terms of his IBS-D  He does report up to 2-3 bowel movements daily  He has tried Viberzi in the past, however this made him worse  He denies hematochezia, melena or abdominal pain  Last colonoscopy was in March 2017 for diarrhea and history of polyps  Other than moderately severe diverticulosis, colonoscopy was normal  Biopsies were taken, which were negative for microscopic colitis  Last EGD was in 2014 with Farhad 75  Biopsies at that time were negative for Newman's esophagus  He does have history of candida esophagitis  Review of Systems:    CONSTITUTIONAL: Denies any fever, chills, or rigors  Good appetite, and no recent weight loss  HEENT: No earache or tinnitus  Denies hearing loss or visual disturbances  CARDIOVASCULAR: No chest pain or palpitations  RESPIRATORY: Denies any cough, hemoptysis, shortness of breath or dyspnea on exertion  GASTROINTESTINAL: As noted in the History of Present Illness  GENITOURINARY: No problems with urination  Denies any hematuria or dysuria  NEUROLOGIC: No dizziness or vertigo, denies headaches  MUSCULOSKELETAL: Denies any muscle or joint pain  SKIN: Denies skin rashes or itching  ENDOCRINE: Denies excessive thirst  Denies intolerance to heat or cold  PSYCHOSOCIAL: Denies depression or anxiety  Denies any recent memory loss         Current Outpatient Prescriptions   Medication Sig Dispense Refill    ALPRAZolam (XANAX) 0 5 mg tablet TAKE 1 TAB TWICE DAILY AS NEEDED  1    famotidine (PEPCID) 40 MG tablet Take 1 tablet (40 mg total) by mouth daily for 90 days 90 tablet 1    metoprolol succinate (TOPROL-XL) 25 mg 24 hr tablet Take 25 mg by mouth daily  5    Mometasone Furo-Formoterol Fum (DULERA IN) Inhale      Multiple Vitamin (MULTIVITAMIN) capsule Take 1 capsule by mouth daily      pantoprazole (PROTONIX) 40 mg tablet TAKE 1 TABLET (40 MG TOTAL) BY MOUTH DAILY 30 tablet 0    sildenafil (REVATIO) 20 mg tablet Take 5 tablets (100 mg total) by mouth as needed (Erectile dysfunction) 90 tablet 5    rifaximin (XIFAXAN) 550 mg tablet Take 1 tablet (550 mg total) by mouth every 8 (eight) hours for 14 days 42 tablet 0     No current facility-administered medications for this visit  Past Medical History:   Diagnosis Date    Asthma     Erectile dysfunction     IBS (irritable bowel syndrome)      Past Surgical History:   Procedure Laterality Date    KNEE SURGERY      [place] COLONOSCOPY FLX DX W/COLLJ SPEC WHEN PFRMD N/A 3/20/2017    Procedure: COLONOSCOPY;  Surgeon: Bianca Howell MD;  Location: [facility];   Service: Gastroenterology    TONSILLECTOMY       Social History     Social History    Marital status:      Spouse name: N/A    Number of children: N/A    Years of education: N/A     Occupational History    Retired      Social History Main Topics    Smoking status: Former Smoker     Packs/day: 1 00     Years: 30 00     Quit date: 10/2016    Smokeless tobacco: Never Used    Alcohol use Yes      Comment: OCCASSIONAL    Drug use: No    Sexual activity: Not on file     Other Topics Concern    Not on file     Social History Narrative    No narrative on file     Family History   Problem Relation Age of Onset    Diabetes Father     Heart disease Father     Hypertension Father             PHYSICAL EXAM:    Vitals:    05/09/18 0818   BP: 130/80   Pulse: 72   Weight: 88 kg (194 lb)   Height: 6' (1 829 m)     General Appearance:   Alert and oriented x 3  Cooperative, and in no respiratory distress   HEENT:   Normocephalic, atraumatic, anicteric      Neck:  Supple, symmetrical, trachea midline   Lungs:   Clear to auscultation bilaterally; no rales, rhonchi or wheezing; respirations unlabored    Heart[de-identified]   Regular rate and rhythm; no murmur, rub, or gallop  Abdomen:   Soft, non-tender, non-distended; normal bowel sounds; no masses, no organomegaly    Genitalia:   Deferred    Rectal:   Deferred    Extremities:  No cyanosis, clubbing or edema    Pulses:  2+ and symmetric all extremities    Skin:  Skin color, texture, turgor normal, no rashes or lesions    Lymph nodes:  No palpable cervical or supraclavicular lymphadenopathy          ASSESSMENT and PLAN:      1) GERD - Stable off PPI  Instructed to take Pepcid on an as needed basis  Continue lifestyle modifications such as diet and exercise  No alarm symptoms currently  2) IBS-D - Will attempt to get prior authorization for Xifaxan x 2 weeks  Otherwise, colonoscopy recall in 5 years  Follow up PRN

## 2018-05-15 ENCOUNTER — PROCEDURE VISIT (OUTPATIENT)
Dept: UROLOGY | Facility: MEDICAL CENTER | Age: 53
End: 2018-05-15
Payer: COMMERCIAL

## 2018-05-15 VITALS
DIASTOLIC BLOOD PRESSURE: 80 MMHG | BODY MASS INDEX: 26.28 KG/M2 | WEIGHT: 194 LBS | SYSTOLIC BLOOD PRESSURE: 130 MMHG | HEIGHT: 72 IN

## 2018-05-15 DIAGNOSIS — Z30.2 ENCOUNTER FOR STERILIZATION: Primary | ICD-10-CM

## 2018-05-15 PROCEDURE — 55250 REMOVAL OF SPERM DUCT(S): CPT | Performed by: UROLOGY

## 2018-05-15 PROCEDURE — 88302 TISSUE EXAM BY PATHOLOGIST: CPT | Performed by: PATHOLOGY

## 2018-05-15 NOTE — PROGRESS NOTES
Vasectomy     Date/Time 5/15/2018 8:28 AM     Performed by  Steffi Maravilla by Homar Montaño       Consent: Verbal consent obtained  Risks and benefits: risks, benefits and alternatives were discussed  Consent given by: patient  Patient understanding: patient states understanding of the procedure being performed  Patient consent: the patient's understanding of the procedure matches consent given  Procedure consent: procedure consent matches procedure scheduled  Relevant documents: relevant documents present and verified  Test results: test results not available  Site marked: the operative site was not marked  Imaging studies: imaging studies not available  Required items: required blood products, implants, devices, and special equipment available  Patient identity confirmed: verbally with patient  Time out: Immediately prior to procedure a "time out" was called to verify the correct patient, procedure, equipment, support staff and site/side marked as required  Preparation: Patient was prepped and draped in the usual sterile fashion  Site preparation: Betadine    Local anesthesia used: yes      Anesthesia: local infiltration and nerve block     Anesthesia   Local anesthesia used: yes  Local Anesthetic: lidocaine 1% without epinephrine  Anesthetic total: 10 mL     Sedation   Patient sedated: no      Specimen: yes    Culture: no   Procedure Details   Procedure Notes: Patient was placed in the supine position on the office operating room table after identification by the surgeon  A rubber band was placed loosely around the penile shaft and clipped to the patient sure for retraction and the genitalia was prepped with Betadine and the patient appropriately draped    Using 1% lidocaine without epinephrine in a local infiltrative anesthetic was administered subdermally in the midline anteriorly in the scrotal skin and then this was continued as a spermatic cord block on both the left and right sides  After the patient was shown to be numb in that region a sharp hemostat was used to make a puncture wound in the anterior scrotum and the [place] clamp used to grab each of the left and right vas deferens  Adventitial layer was stripped from the vas deferens sharply using the hemostatic clamp and a segment of each vas was excised approximately 0 5 cm in length and sent for specimen  All 4 free ends were cauterized and the up and adventitia duct inside its surrounding layer which was clipped for both hemostasis of the vas as blood vessels and buried the end in a different tissue layer  After a surgical clip was applied a bandage was applied to the skin  There was no complications and the patient tolerated the procedure well  Postoperative instructions were explained to the patient in detail    Patient tolerance: Patient tolerated the procedure well with no immediate complications

## 2018-05-15 NOTE — LETTER
May 15, 2018     Jeana Root DO  [facility]  [address]  [address] [place] [address]    Patient: Gilbert Neves   YOB: 1965   Date of Visit: 5/15/2018       Dear Dr Watkins Forth: Thank you for referring Luis Tinoco to me for evaluation  Below are my notes for this consultation  If you have questions, please do not hesitate to call me  I look forward to following your patient along with you           Sincerely,        Subhash Riggins MD        CC: No Recipients

## 2018-05-15 NOTE — PATIENT INSTRUCTIONS
Vasectomy   WHAT YOU NEED TO KNOW:   A vasectomy is a procedure to make you sterile  It is a permanent form of birth control  The vas deferens (sperm tubes) are cut so that the semen does not contain sperm  DISCHARGE INSTRUCTIONS:   Medicines: You may need any of the following:  · NSAIDs  help decrease swelling, pain, and fever  This medicine can be bought with or without a doctor's order  This medicine can cause stomach bleeding or kidney problems in certain people  If you take blood thinner medicine, always ask your healthcare provider if NSAIDs are safe for you  Always read the medicine label and follow the directions on it before using this medicine  · Take your medicine as directed  Contact your healthcare provider if you think your medicine is not helping or if you have side effects  Tell him if you are allergic to any medicine  Keep a list of the medicines, vitamins, and herbs you take  Include the amounts, and when and why you take them  Bring the list or the pill bottles to follow-up visits  Carry your medicine list with you in case of an emergency  Decrease pain and swelling:   · Lie on your back  as much as possible the day of your procedure  Place a cushion such as a washcloth or small towel under your scrotum to elevate it  · Apply ice  on your scrotum for 15 to 20 minutes every hour for 2 days  Use an ice pack, or put crushed ice in a plastic bag  Cover it with a towel  Ice helps prevent tissue damage and decreases swelling and pain  · Wear an athletic supporter for at least 2 days  This will decrease pain and swelling, and protect your wound  Wound care:  Care for your wound as directed  Carefully wash the wound with soap and water  Dry the area and put on new, clean bandages as directed  Change your bandages when they get wet or dirty  Activity:  You may walk and drive normally the day after your procedure   Do not play sports, do yard work, or lift anything heavy until your healthcare provider says it is okay  You may need to wait up to a week before you have sex  Follow up with your healthcare provider or surgeon in 12 weeks: You will need to return to have your semen tested for sperm  Write down your questions so you remember to ask them during your visits  Contact your healthcare provider or surgeon if:   · You have a fever  · Your wound is red, swollen, or draining pus  · You feel pain or burning when you urinate  · You have worsening pain in your scrotum, even after you take medicine  · You have questions or concerns about your condition or care  Seek care immediately or call 911 if:   · Blood soaks through your bandage  · Your stitches come apart  · You see blood in your urine or semen  © 2017 [employer] Information is for End User's use only and may not be sold, redistributed or otherwise used for commercial purposes  All illustrations and images included in CareNotes® are the copyrighted property of A D [employer] M [employer] Inc  or Matthew Price  The above information is an  only  It is not intended as medical advice for individual conditions or treatments  Talk to your doctor, nurse or pharmacist before following any medical regimen to see if it is safe and effective for you

## 2018-05-23 ENCOUNTER — CLINICAL SUPPORT (OUTPATIENT)
Dept: UROLOGY | Facility: MEDICAL CENTER | Age: 53
End: 2018-05-23

## 2018-05-23 VITALS
WEIGHT: 194 LBS | SYSTOLIC BLOOD PRESSURE: 124 MMHG | DIASTOLIC BLOOD PRESSURE: 80 MMHG | BODY MASS INDEX: 26.28 KG/M2 | HEIGHT: 72 IN

## 2018-05-23 DIAGNOSIS — Z98.52 STATUS POST VASECTOMY: ICD-10-CM

## 2018-05-23 PROCEDURE — 99024 POSTOP FOLLOW-UP VISIT: CPT

## 2018-05-23 NOTE — PROGRESS NOTES
Patient return for post vasectomy check  Patient denies swelling, pain, drainage or fever  Area clean and dry, site approximated  Slight amount of ecchymosis present at site  Pt did not require any pain medication

## 2018-05-31 ENCOUNTER — HOSPITAL ENCOUNTER (OUTPATIENT)
Dept: SLEEP CENTER | Facility: CLINIC | Age: 53
Discharge: HOME | End: 2018-05-31
Payer: COMMERCIAL

## 2018-05-31 DIAGNOSIS — G47.33 OSA (OBSTRUCTIVE SLEEP APNEA): ICD-10-CM

## 2018-05-31 PROCEDURE — 95810 POLYSOM 6/> YRS 4/> PARAM: CPT | Performed by: INTERNAL MEDICINE

## 2018-05-31 PROCEDURE — 95810 POLYSOM 6/> YRS 4/> PARAM: CPT

## 2018-06-01 NOTE — PROGRESS NOTES
Sleep Study Documentation    Pre-Sleep Study       Sleep testing procedure explained to patient:YES    Patient napped prior to study:NO    Caffeine:Dayshift worker after 12PM   Caffeine use:NO    Alcohol:Dayshift workers after 5PM: Alcohol use:NO    Typical day for patient:YES       Study Documentation  Diagnostic   Snore:Severe  Supplemental O2: no    O2 flow rate (L/min) range NA  O2 flow rate (L/min) final NA  Minimum SaO2 78  Baseline SaO2 98        Mode of Therapy:NA    EKG abnormalities: no     EEG abnormalities: no    Study Terminated:no    Patient classification: employed       Post-Sleep Study    Medication used at bedtime or during sleep study:NO    Patient reports time it took to fall asleep:less than 20 minutes    Patient reports waking up during study:1 to 2 times  Patient reports returning to sleep in 10 to 30 minutes  Patient reports sleeping 6 to 8 hours without dreaming  Patient reports sleep during study:typical    Patient rated sleepiness: Somewhat sleepy or tired    PAP treatment:no

## 2018-06-12 ENCOUNTER — TELEPHONE (OUTPATIENT)
Dept: OTHER | Facility: HOSPITAL | Age: 53
End: 2018-06-12

## 2018-06-12 DIAGNOSIS — G47.33 OSA (OBSTRUCTIVE SLEEP APNEA): Primary | ICD-10-CM

## 2018-06-12 NOTE — TELEPHONE ENCOUNTER
Called and notified the patient of the diagnostic study  He is willing to try CPAP  Auto CPAP will be ordered

## 2018-06-29 ENCOUNTER — APPOINTMENT (OUTPATIENT)
Dept: LAB | Facility: HOSPITAL | Age: 53
End: 2018-06-29
Attending: UROLOGY
Payer: COMMERCIAL

## 2018-06-29 DIAGNOSIS — Z30.09 STERILIZATION CONSULT: ICD-10-CM

## 2018-06-29 LAB
COMMENT POST VAS: ABNORMAL
PH SMN: 8.5 [PH] (ref 7.2–8.6)
POST  VAS COLLECTION: ABNORMAL
SPECIMEN VOL SMN: 0.8 ML (ref 1–5)
VISC SMN: 3 CP (ref 3–4)
WBC SMN QL: 1 "HPF"

## 2018-06-29 PROCEDURE — 89321 SEMEN ANAL SPERM DETECTION: CPT

## 2018-07-18 ENCOUNTER — OFFICE VISIT (OUTPATIENT)
Dept: UROLOGY | Facility: MEDICAL CENTER | Age: 53
End: 2018-07-18
Payer: COMMERCIAL

## 2018-07-18 VITALS
WEIGHT: 194 LBS | DIASTOLIC BLOOD PRESSURE: 70 MMHG | SYSTOLIC BLOOD PRESSURE: 120 MMHG | HEIGHT: 72 IN | BODY MASS INDEX: 26.28 KG/M2

## 2018-07-18 DIAGNOSIS — N40.0 BPH WITHOUT OBSTRUCTION/LOWER URINARY TRACT SYMPTOMS: Primary | ICD-10-CM

## 2018-07-18 DIAGNOSIS — N52.02 CORPORO-VENOUS OCCLUSIVE ERECTILE DYSFUNCTION: ICD-10-CM

## 2018-07-18 LAB
SL AMB  POCT GLUCOSE, UA: ABNORMAL
SL AMB LEUKOCYTE ESTERASE,UA: ABNORMAL
SL AMB POCT BILIRUBIN,UA: ABNORMAL
SL AMB POCT BLOOD,UA: ABNORMAL
SL AMB POCT CLARITY,UA: CLEAR
SL AMB POCT COLOR,UA: YELLOW
SL AMB POCT KETONES,UA: ABNORMAL
SL AMB POCT NITRITE,UA: ABNORMAL
SL AMB POCT PH,UA: 5
SL AMB POCT SPECIFIC GRAVITY,UA: 1.02
SL AMB POCT URINE PROTEIN: ABNORMAL
SL AMB POCT UROBILINOGEN: 0.2

## 2018-07-18 PROCEDURE — 81003 URINALYSIS AUTO W/O SCOPE: CPT | Performed by: UROLOGY

## 2018-07-18 PROCEDURE — 99214 OFFICE O/P EST MOD 30 MIN: CPT | Performed by: UROLOGY

## 2018-07-18 NOTE — LETTER
July 18, 2018     Lesli Ramos DO  [address]  [address]  [address] [place] [address]    Patient: Janell Landers   YOB: 1965   Date of Visit: 7/18/2018       Dear Dr Sona Alcaraz: Thank you for referring Jean Cisneros to me for evaluation  Below are my notes for this consultation  If you have questions, please do not hesitate to call me  I look forward to following your patient along with you  Sincerely,        Mike Pedro MD        CC: No Recipients  Mike Pedro MD  7/18/2018 12:25 PM  Sign at close encounter  Assessment/Plan:   1  BPH without obstructive symptoms  The patient's PSA is just about 1 and his GT is not suspicious for malignancy  The patient will be seen yearly for prostate cancer screening  2   Erectile dysfunction secondary to veno-occlusive dysfunction  This has responded nicely to sildenafil 100 mg without side effect  The patient will continue on that medication  3   Status post bilateral vasectomy  The 2nd confirmatory post vasectomy semen or sperm count is due in August of 2018  Diagnoses and all orders for this visit:    BPH without obstruction/lower urinary tract symptoms  -     POCT urine dip auto non-scope  -     PSA Total, Diagnostic; Future  -     Comprehensive metabolic panel; Future  -     Testosterone, free, total; Future    Corporo-venous occlusive erectile dysfunction  -     Testosterone, free, total; Future          Subjective:     Patient ID: Janell Landers is a 52 y o  male  Chief complaint:  Mild prostatic enlargement, recent vasectomy, erectile dysfunction  History of present illness 57-year-old male with early loss of erection responding nicely to sildenafil without side effect  The patient notes no side effects from bilateral vasectomy denying any further pain or swelling    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 and the patient is instructed to get another sperm count in approximately 3 months after the vasectomy which comes to this August   The patient denies any obstructive irritative voiding symptoms including dysuria frequency urgency or incontinence  Review of Systems   All other systems reviewed and are negative  Objective:     Physical Exam   Constitutional: He is oriented to person, place, and time  He appears well-nourished  HENT:   Head: Atraumatic  Eyes: EOM are normal    Neck: Neck supple  Pulmonary/Chest: Effort normal    Abdominal: Soft  Neurological: He is alert and oriented to person, place, and time  Psychiatric: He has a normal mood and affect  His behavior is normal  Judgment and thought content normal    Vitals reviewed

## 2018-07-18 NOTE — PROGRESS NOTES
Assessment/Plan:   1  BPH without obstructive symptoms  The patient's PSA is just about 1 and his GT is not suspicious for malignancy  The patient will be seen yearly for prostate cancer screening  2   Erectile dysfunction secondary to veno-occlusive dysfunction  This has responded nicely to sildenafil 100 mg without side effect  The patient will continue on that medication  3   Status post bilateral vasectomy  The 2nd confirmatory post vasectomy semen or sperm count is due in August of 2018  Diagnoses and all orders for this visit:    BPH without obstruction/lower urinary tract symptoms  -     POCT urine dip auto non-scope  -     PSA Total, Diagnostic; Future  -     Comprehensive metabolic panel; Future  -     Testosterone, free, total; Future    Corporo-venous occlusive erectile dysfunction  -     Testosterone, free, total; Future          Subjective:     Patient ID: Akiko Lo is a 52 y o  male  Chief complaint:  Mild prostatic enlargement, recent vasectomy, erectile dysfunction  History of present illness 27-year-old male with early loss of erection responding nicely to sildenafil without side effect  The patient notes no side effects from bilateral vasectomy denying any further pain or swelling  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 and the patient is instructed to get another sperm count in approximately 3 months after the vasectomy which comes to this August   The patient denies any obstructive irritative voiding symptoms including dysuria frequency urgency or incontinence  Review of Systems   All other systems reviewed and are negative  Objective:     Physical Exam   Constitutional: He is oriented to person, place, and time  He appears well-nourished  HENT:   Head: Atraumatic  Eyes: EOM are normal    Neck: Neck supple  Pulmonary/Chest: Effort normal    Abdominal: Soft  Neurological: He is alert and oriented to person, place, and time  Psychiatric: He has a normal mood and affect  His behavior is normal  Judgment and thought content normal    Vitals reviewed

## 2018-09-18 DIAGNOSIS — K21.9 GASTROESOPHAGEAL REFLUX DISEASE, ESOPHAGITIS PRESENCE NOT SPECIFIED: ICD-10-CM

## 2018-09-18 RX ORDER — FAMOTIDINE 40 MG/1
TABLET, FILM COATED ORAL
Qty: 90 TABLET | Refills: 1 | Status: SHIPPED | OUTPATIENT
Start: 2018-09-18 | End: 2019-03-09 | Stop reason: SDUPTHER

## 2019-03-09 DIAGNOSIS — K21.9 GASTROESOPHAGEAL REFLUX DISEASE, ESOPHAGITIS PRESENCE NOT SPECIFIED: ICD-10-CM

## 2019-03-09 RX ORDER — FAMOTIDINE 40 MG/1
TABLET, FILM COATED ORAL
Qty: 90 TABLET | Refills: 1 | Status: SHIPPED | OUTPATIENT
Start: 2019-03-09 | End: 2020-05-15 | Stop reason: SDUPTHER

## 2019-05-31 DIAGNOSIS — N52.1 ERECTILE DYSFUNCTION DUE TO DISEASES CLASSIFIED ELSEWHERE: ICD-10-CM

## 2019-06-05 RX ORDER — SILDENAFIL 100 MG/1
TABLET, FILM COATED ORAL
Qty: 12 TABLET | Refills: 0 | Status: SHIPPED | OUTPATIENT
Start: 2019-06-05 | End: 2019-07-27 | Stop reason: SDUPTHER

## 2019-07-16 DIAGNOSIS — N40.0 BPH WITHOUT OBSTRUCTION/LOWER URINARY TRACT SYMPTOMS: Primary | ICD-10-CM

## 2019-07-27 DIAGNOSIS — N52.1 ERECTILE DYSFUNCTION DUE TO DISEASES CLASSIFIED ELSEWHERE: ICD-10-CM

## 2019-07-29 RX ORDER — SILDENAFIL 100 MG/1
TABLET, FILM COATED ORAL
Qty: 12 TABLET | Refills: 0 | Status: SHIPPED | OUTPATIENT
Start: 2019-07-29 | End: 2019-12-16

## 2019-07-31 DIAGNOSIS — N52.1 ERECTILE DYSFUNCTION DUE TO DISEASES CLASSIFIED ELSEWHERE: ICD-10-CM

## 2019-07-31 RX ORDER — SILDENAFIL CITRATE 20 MG/1
100 TABLET ORAL AS NEEDED
Qty: 90 TABLET | Refills: 5 | Status: SHIPPED | OUTPATIENT
Start: 2019-07-31 | End: 2019-12-16

## 2019-10-03 ENCOUNTER — TELEPHONE (OUTPATIENT)
Dept: PULMONOLOGY | Facility: MEDICAL CENTER | Age: 54
End: 2019-10-03

## 2019-10-03 DIAGNOSIS — G47.33 OSA (OBSTRUCTIVE SLEEP APNEA): Primary | ICD-10-CM

## 2019-10-03 NOTE — TELEPHONE ENCOUNTER
Needs new cpap order put in  Can you please put order in  The order that is in his chart now is to old

## 2019-10-07 DIAGNOSIS — G47.33 OSA (OBSTRUCTIVE SLEEP APNEA): Primary | ICD-10-CM

## 2019-10-07 NOTE — TELEPHONE ENCOUNTER
I dont believe he ever had CPAP  I saw his diagnostic study recommending sleep titration study  Ordered titration study

## 2019-10-16 DIAGNOSIS — G47.33 OSA (OBSTRUCTIVE SLEEP APNEA): Primary | ICD-10-CM

## 2019-10-30 ENCOUNTER — OFFICE VISIT (OUTPATIENT)
Dept: PULMONOLOGY | Facility: MEDICAL CENTER | Age: 54
End: 2019-10-30
Payer: COMMERCIAL

## 2019-10-30 VITALS
HEART RATE: 86 BPM | DIASTOLIC BLOOD PRESSURE: 86 MMHG | WEIGHT: 189 LBS | TEMPERATURE: 99 F | SYSTOLIC BLOOD PRESSURE: 140 MMHG | RESPIRATION RATE: 12 BRPM | BODY MASS INDEX: 25.6 KG/M2 | OXYGEN SATURATION: 95 % | HEIGHT: 72 IN

## 2019-10-30 DIAGNOSIS — G47.33 OSA (OBSTRUCTIVE SLEEP APNEA): Primary | ICD-10-CM

## 2019-10-30 PROCEDURE — 99213 OFFICE O/P EST LOW 20 MIN: CPT | Performed by: NURSE PRACTITIONER

## 2019-10-30 NOTE — ASSESSMENT & PLAN NOTE
Patient has mild-to-moderate obstructive sleep apnea  According to patient he had a diagnosis approximately 20 years ago in used a CPAP for short time  Patient had a repeat diagnostic study May 31, 2018  Apneic hypopnea index was 9 6 events per hour and increased to 18 4 during REM sleep  He never went for CPAP titration study nor did he ever receive a CPAP machine  I briefly reviewed anatomy the upper airway, diagnosis and treatment of KWAKU  He is aware that he will have an auto CPAP  He also is aware that he will need to come in for compliance study in approximately 45 days after delivery of machine  All questions were answered

## 2019-10-30 NOTE — PROGRESS NOTES
Assessment/Plan:     Problem List Items Addressed This Visit        Respiratory    KWAKU (obstructive sleep apnea) - Primary     Patient has mild-to-moderate obstructive sleep apnea  According to patient he had a diagnosis approximately 20 years ago in used a CPAP for short time  Patient had a repeat diagnostic study May 31, 2018  Apneic hypopnea index was 9 6 events per hour and increased to 18 4 during REM sleep  He never went for CPAP titration study nor did he ever receive a CPAP machine  I briefly reviewed anatomy the upper airway, diagnosis and treatment of KWAKU  He is aware that he will have an auto CPAP  He also is aware that he will need to come in for compliance study in approximately 45 days after delivery of machine  All questions were answered  Relevant Orders    CPAP Auto New DME            Return in about 4 weeks (around 11/27/2019)  All questions are answered to the patient's satisfaction and understanding  He verbalizes understanding  He is encouraged to call with any further questions or concerns  Portions of the record may have been created with voice recognition software  Occasional wrong word or "sound a like" substitutions may have occurred due to the inherent limitations of voice recognition software  Read the chart carefully and recognize, using context, where substitutions have occurred  HPI:  Trevin Kam is a 66-year-old male who was here today for diagnosis of obstructive sleep apnea  He had a diagnostic sleep study done May 31, 2018  He had mild obstructive sleep apnea that was moderate during REM sleep  Apneic hypopnea index was 9 6 events per hour and increased to 18 4 events per hour  He also had oxygen kathie of 70% with O2 sat below 89% for 6 9 minutes  According patient he was diagnosed approximately 20  He did have CPAP 1 time and id malfunctioned approximately 5 years ago    Since his last diagnostic study he did not follow up type study nor did he get a CPAP   He is here today as he snores loudly and was recently   He has daytime hypersomnolence and would like to proceed with auto CPAP    Electronically Signed by CLAUDIO Villasenor    ______________________________________________________________________    Chief Complaint:   Chief Complaint   Patient presents with    Follow-up    Sleep Apnea    Results     Dx Sleep study       Patient ID: Perla Dutton is a 62 y o  y o  male has a past medical history of Asthma and IBS (irritable bowel syndrome)  10/30/2019  Patient presents today for follow-up visit  Fatigue   This is a chronic problem  The current episode started more than 1 year ago  The problem has been gradually worsening  Associated symptoms include fatigue  He has tried lying down, sleep and walking for the symptoms  The treatment provided mild relief  Review of Systems   Constitutional: Positive for fatigue  HENT: Negative  Respiratory: Negative  Cardiovascular: Negative  Gastrointestinal: Negative  Endocrine: Negative  Genitourinary: Negative  Musculoskeletal: Negative  Skin: Negative  Allergic/Immunologic: Negative  Neurological: Negative  Psychiatric/Behavioral: Negative  Smoking history: He reports that he quit smoking about 3 years ago  He has a 30 00 pack-year smoking history   He has never used smokeless tobacco     The following portions of the patient's history were reviewed and updated as appropriate: allergies, current medications, past family history, past medical history, past social history, past surgical history and problem list     Immunization History   Administered Date(s) Administered    INFLUENZA 11/19/2015     Current Outpatient Medications   Medication Sig Dispense Refill    ALPRAZolam (XANAX) 0 5 mg tablet TAKE 1 TAB TWICE DAILY AS NEEDED  1    Multiple Vitamin (MULTIVITAMIN) capsule Take 1 capsule by mouth daily      famotidine (PEPCID) 40 MG tablet TAKE 1 TABLET BY MOUTH EVERY DAY (Patient not taking: Reported on 10/30/2019) 90 tablet 1    metoprolol succinate (TOPROL-XL) 25 mg 24 hr tablet Take 25 mg by mouth daily  5    Mometasone Furo-Formoterol Fum (DULERA IN) Inhale      pantoprazole (PROTONIX) 40 mg tablet TAKE 1 TABLET (40 MG TOTAL) BY MOUTH DAILY (Patient not taking: Reported on 10/30/2019) 30 tablet 0    sildenafil (REVATIO) 20 mg tablet TAKE 5 TABLETS (100 MG TOTAL) BY MOUTH AS NEEDED (ERECTILE DYSFUNCTION)  (Patient not taking: Reported on 10/30/2019) 90 tablet 5    sildenafil (VIAGRA) 100 mg tablet TAKE 1 TABLET BY MOUTH ONE  HOUR PRIOR TO INTERCOURSE (Patient not taking: Reported on 10/30/2019) 12 tablet 0     No current facility-administered medications for this visit  Allergies: Patient has no known allergies  Objective:  Vitals:    10/30/19 0829   BP: 140/86   BP Location: Left arm   Patient Position: Sitting   Cuff Size: Standard   Pulse: 86   Resp: 12   Temp: 99 °F (37 2 °C)   TempSrc: Tympanic   SpO2: 95%   Weight: 85 7 kg (189 lb)   Height: 6' (1 829 m)   Oxygen Therapy  SpO2: 95 %    Wt Readings from Last 3 Encounters:   10/30/19 85 7 kg (189 lb)   07/18/18 88 kg (194 lb)   05/23/18 88 kg (194 lb)     Body mass index is 25 63 kg/m²  Physical Exam   Constitutional: He appears well-developed and well-nourished  HENT:   Head: Normocephalic and atraumatic  Mallampati 3   Eyes: Pupils are equal, round, and reactive to light  EOM are normal    Neck: Normal range of motion  Cardiovascular: Normal rate and regular rhythm  Pulmonary/Chest: Effort normal    Abdominal: Soft  Musculoskeletal: Normal range of motion  Neurological: He is alert  Skin: Skin is warm  Psychiatric: He has a normal mood and affect  His behavior is normal        Lab Review:   No visits with results within 6 Month(s) from this visit     Latest known visit with results is:   Office Visit on 07/18/2018   Component Date Value     COLOR,UA 07/18/2018 yellow     CLARITY,UA 07/18/2018 clear     SPECIFIC GRAVITY,UA 07/18/2018 1 025      PH,UA 07/18/2018 5 0     LEUKOCYTE ESTERASE,UA 07/18/2018 tr     NITRITE,UA 07/18/2018 n     GLUCOSE, UA 07/18/2018 n     KETONES,UA 07/18/2018 n     BILIRUBIN,UA 07/18/2018 n     BLOOD,UA 07/18/2018 n     POCT URINE PROTEIN 07/18/2018 tr     SL AMB POCT UROBILINOGEN 07/18/2018 0 2        Diagnostics:  I have personally reviewed pertinent reports  Office Spirometry Results:     ESS: Total score: 16  No results found

## 2019-11-22 ENCOUNTER — TELEPHONE (OUTPATIENT)
Dept: GASTROENTEROLOGY | Facility: AMBULARY SURGERY CENTER | Age: 54
End: 2019-11-22

## 2019-11-22 NOTE — TELEPHONE ENCOUNTER
Patients GI provider:  SWITCHING DR    Number to return call: (    Reason for call: Pt called requesting an appt with dr Wendy Wells  Pt had appt 11/22 at 4pm but was called to r/s     Scheduled procedure/appointment date if applicable: Apt/procedure    r/s

## 2019-11-25 NOTE — TELEPHONE ENCOUNTER
Pt called very upset because no one called him back to r/s his appt  Pt would like a call as the date that was sched does not work for him

## 2019-11-25 NOTE — TELEPHONE ENCOUNTER
12/18 at 240p with Dr Nahid Sandhu at [facility]  Please cancel whichever apt pt does not wish to keep

## 2019-11-26 NOTE — TELEPHONE ENCOUNTER
Called patient and informed of the 2 appts we have scheduled for him  Apologized no one ever got back to him to reschedule his previous appt that we had to cancel  He stated that he needed Fridays only so took appt in January  Cancelled appt 12/18

## 2019-12-13 ENCOUNTER — OFFICE VISIT (OUTPATIENT)
Dept: UROLOGY | Facility: MEDICAL CENTER | Age: 54
End: 2019-12-13
Payer: COMMERCIAL

## 2019-12-13 VITALS
WEIGHT: 193 LBS | HEIGHT: 72 IN | HEART RATE: 90 BPM | DIASTOLIC BLOOD PRESSURE: 90 MMHG | SYSTOLIC BLOOD PRESSURE: 140 MMHG | BODY MASS INDEX: 26.14 KG/M2

## 2019-12-13 DIAGNOSIS — N40.0 BPH WITHOUT OBSTRUCTION/LOWER URINARY TRACT SYMPTOMS: Primary | ICD-10-CM

## 2019-12-13 DIAGNOSIS — N52.02 CORPORO-VENOUS OCCLUSIVE ERECTILE DYSFUNCTION: ICD-10-CM

## 2019-12-13 LAB
SL AMB  POCT GLUCOSE, UA: NORMAL
SL AMB LEUKOCYTE ESTERASE,UA: NORMAL
SL AMB POCT BILIRUBIN,UA: NORMAL
SL AMB POCT BLOOD,UA: NORMAL
SL AMB POCT CLARITY,UA: CLEAR
SL AMB POCT COLOR,UA: YELLOW
SL AMB POCT KETONES,UA: NORMAL
SL AMB POCT NITRITE,UA: NORMAL
SL AMB POCT PH,UA: 5
SL AMB POCT SPECIFIC GRAVITY,UA: >=1.03
SL AMB POCT URINE PROTEIN: NORMAL
SL AMB POCT UROBILINOGEN: 0.2

## 2019-12-13 PROCEDURE — 81003 URINALYSIS AUTO W/O SCOPE: CPT | Performed by: UROLOGY

## 2019-12-13 PROCEDURE — 99214 OFFICE O/P EST MOD 30 MIN: CPT | Performed by: UROLOGY

## 2019-12-13 RX ORDER — TADALAFIL 20 MG/1
20 TABLET ORAL AS NEEDED
Qty: 6 TABLET | Refills: 6 | Status: SHIPPED | OUTPATIENT
Start: 2019-12-13 | End: 2019-12-16

## 2019-12-13 RX ORDER — TADALAFIL 20 MG/1
20 TABLET ORAL AS NEEDED
Qty: 30 TABLET | Refills: 6 | Status: SHIPPED | OUTPATIENT
Start: 2019-12-13 | End: 2019-12-16

## 2019-12-13 NOTE — PROGRESS NOTES
Assessment/Plan:      Diagnoses and all orders for this visit:    BPH without obstruction/lower urinary tract symptoms  -     POCT urine dip auto non-scope    Corporo-venous occlusive erectile dysfunction  -     tadalafil (CIALIS) 20 MG tablet; Take 1 tablet (20 mg total) by mouth as needed for erectile dysfunction  -     tadalafil (CIALIS) 20 MG tablet; Take 1 tablet (20 mg total) by mouth as needed for erectile dysfunction          Subjective:  No complaints     Patient ID: Demian Ortiz is a 62 y o  male  HPI  History of present illness 61-year-old male with early loss of erection responding nicely to sildenafil without side effect  He had some questions about possibly using Cialis and requested prescriptions for such  The patient notes no side effects from bilateral vasectomy denying any further pain or swelling  The patient denies any obstructive irritative voiding symptoms including dysuria frequency urgency or incontinence      Review of Systems   Constitutional: Negative  HENT: Negative  Eyes: Negative  Respiratory: Negative  Cardiovascular: Negative  Gastrointestinal: Negative  Endocrine: Negative  Genitourinary: Negative  Musculoskeletal: Negative  Skin: Negative  Allergic/Immunologic: Negative  Neurological: Negative  Hematological: Negative  Psychiatric/Behavioral: Negative  Objective:     Physical Exam   Constitutional: He is oriented to person, place, and time  He appears well-developed and well-nourished  No distress  HENT:   Head: Normocephalic and atraumatic  Nose: Nose normal    Mouth/Throat: Oropharynx is clear and moist    Eyes: Pupils are equal, round, and reactive to light  Conjunctivae and EOM are normal  No scleral icterus  Neck: Normal range of motion  Neck supple  Cardiovascular: Normal rate, regular rhythm, normal heart sounds and intact distal pulses  No murmur heard    Pulmonary/Chest: Effort normal and breath sounds normal  No respiratory distress  He has no wheezes  He has no rales  Abdominal: Soft  Bowel sounds are normal  He exhibits no distension and no mass  There is no tenderness  Genitourinary: Prostate is enlarged  Prostate is not tender  Genitourinary Comments: Prostate exam:  1 to 2/4 enlargement, smooth, nontender, no evidence of nodules or induration   Musculoskeletal: Normal range of motion  He exhibits no edema or tenderness  Lymphadenopathy:     He has no cervical adenopathy  Neurological: He is alert and oriented to person, place, and time  No cranial nerve deficit  Skin: Skin is warm and dry  No rash noted  No erythema  No pallor  Psychiatric: He has a normal mood and affect  His behavior is normal  Judgment and thought content normal    Nursing note and vitals reviewed        PSA from 11/25/2019 was 1 1

## 2019-12-16 DIAGNOSIS — N52.9 ERECTILE DYSFUNCTION, UNSPECIFIED ERECTILE DYSFUNCTION TYPE: Primary | ICD-10-CM

## 2019-12-16 RX ORDER — SILDENAFIL 100 MG/1
100 TABLET, FILM COATED ORAL DAILY PRN
Qty: 30 TABLET | Refills: 0 | Status: SHIPPED | OUTPATIENT
Start: 2019-12-16 | End: 2019-12-27 | Stop reason: SDUPTHER

## 2019-12-16 NOTE — TELEPHONE ENCOUNTER
Patient of Dr Yakelin Rodríguez seen in [facility]  Patient was given prescription Cialis and does not like it  He wants to go back on Viagra  Please call patient back at [phone]

## 2019-12-27 RX ORDER — SILDENAFIL 100 MG/1
TABLET, FILM COATED ORAL
Qty: 30 TABLET | Refills: 5 | Status: SHIPPED | OUTPATIENT
Start: 2019-12-27 | End: 2021-01-14 | Stop reason: SDUPTHER

## 2019-12-27 NOTE — TELEPHONE ENCOUNTER
Patient left a message on the Medication Refill voice mail line requesting a new prescription for Sildenafil (VIAGRA) 100mg  The patient was last seen on 12/13/19 by Dr Yajaira Mayes in the [facility] location  Patient was using Tadalafil in the past but has found better results using Sildenafil  His current insurance plan will pay for 6 tablets for $10 copayment  We discussed more cost-effective options such as NOT using his insurance and paying cash while using the 's discount card  A 30 count supply to [facility] will cost only $19 81! Patient very interested in this option  GoodRx coupon sent to the patient via text; he confirmed receipt of same    Script for same, 30 count supply with 5 refills was queued and forwarded to the Advanced Practitioner covering the [facility] location for approval

## 2020-03-02 ENCOUNTER — OFFICE VISIT (OUTPATIENT)
Dept: GASTROENTEROLOGY | Facility: AMBULARY SURGERY CENTER | Age: 55
End: 2020-03-02
Payer: COMMERCIAL

## 2020-03-02 VITALS
DIASTOLIC BLOOD PRESSURE: 70 MMHG | HEIGHT: 72 IN | HEART RATE: 91 BPM | WEIGHT: 197.6 LBS | BODY MASS INDEX: 26.76 KG/M2 | RESPIRATION RATE: 18 BRPM | SYSTOLIC BLOOD PRESSURE: 120 MMHG | TEMPERATURE: 99.3 F

## 2020-03-02 DIAGNOSIS — K58.0 IRRITABLE BOWEL SYNDROME WITH DIARRHEA: ICD-10-CM

## 2020-03-02 DIAGNOSIS — K21.9 GASTROESOPHAGEAL REFLUX DISEASE, ESOPHAGITIS PRESENCE NOT SPECIFIED: Primary | ICD-10-CM

## 2020-03-02 DIAGNOSIS — K63.5 POLYP OF COLON, UNSPECIFIED PART OF COLON, UNSPECIFIED TYPE: ICD-10-CM

## 2020-03-02 PROCEDURE — 99214 OFFICE O/P EST MOD 30 MIN: CPT | Performed by: INTERNAL MEDICINE

## 2020-03-02 NOTE — LETTER
March 2, 2020     Madi Bolanos, [address] [address]    Patient: Army Ngueyn   YOB: 1965   Date of Visit: 3/2/2020       Dear Dr Jonathan Chandra: Thank you for referring Nydia Cardona to me for evaluation  Below are my notes for this consultation  If you have questions, please do not hesitate to call me  I look forward to following your patient along with you  Sincerely,        Davis Carbajal MD        CC: No Recipients  Davis Carbajal MD  3/2/2020 12:43 PM  Sign at close encounter  [address] [facility]  Progress Note - Army Nguyen 62 y o  male MRN: [id number]    Unit/Bed#:  Encounter: [id number]    Assessment/Plan:    1  History of colon polyps-previous colonoscopy without any polyps  Patient was recommended a repeat at 5 year interval however patient reports that his previous gastroenterologist at [facility] had recommended sooner colonoscopy  Would recommend obtaining these records to see whether patient had any adenomatous/tubulovillous adenomatous polyps, if he has, would recommend a colonoscopy at this time  2  IBS D-appears to be well controlled, has occasional symptoms of incomplete evacuation, would recommend starting on fiber supplement such as Metamucil 1 tbsp on daily basis along with increase water intake  Patient should be taking probiotics on daily basis  -he has had history of small intestinal bacterial overgrowth in the past and has been on will Xifaxan and dicyclomine with symptomatic improvement many years ago  He was also on Viberzi 3 years ago which made symptoms worse  3  GERD-patient does not report dysphagia/odynophagia/loss of appetite  Does not recall having had diagnosis of Newman's esophagus   -continue famotidine on daily basis  Patient was explained about the lifestyle and dietary modifications  Advised to avoid fatty foods, chocolates, caffeine, alcohol and any other triggering foods    Avoid eating for at least 3 hours before going to bed  Subjective: This is a 57-year-old male with history of IBS D and GERD who presents for follow-up  He was previously seen by Anastasia Sosa in May of 2018  He has previously been on Xifaxan without much symptomatic relief, most recently he was on viberzi which made the symptoms worse  Patient reports that he has been doing well, he reports that he sometimes feels incomplete evacuation but denies any rectal bleeding or change in bowel habits  Patient does report that he has had history of colon polyps on previous colonoscopies by [facility]  I do not have the results of the previous colonoscopy from there however his last colonoscopy was in March of 2017 which was negative for microscopic colitis, there was evidence of diverticulosis and internal hemorrhoids  There were no polyps seen at the time  Patient was subsequently recommended 5 year follow-up  Patient reports that Dr Elda Clifton is not aware of his previous history of colon polyps  He states that he was recommended to have colonoscopy every 3-5 years  No unintentional weight loss  Reports that acid reflux symptoms are well controlled, takes famotidine on as-needed basis  No dysphagia, odynophagia, nausea or vomiting  No unintentional weight loss or loss of appetite  Objective:     Vitals: Blood pressure 120/70, pulse 91, temperature 99 3 °F (37 4 °C), resp  rate 18, height 6' (1 829 m), weight 89 6 kg (197 lb 9 6 oz)  ,Body mass index is 26 8 kg/m²  [unfilled]    Physical Exam:    GEN: wn/wd, NAD  HEENT: MMM, no cervical or supraclavicular LAD, anciteric  CV: RRR, no m/r/g  CHEST: CTA b/l, no w/r/r  ABD: +BS, soft, NT/ND, no hepatosplenomegaly  EXT: no c/c/e  SKIN: no rashes  NEURO: aaox3      Invasive Devices     None                         Lab, Imaging and other studies:     No visits with results within 1 Day(s) from this visit     Latest known visit with results is:   Office Visit on 12/13/2019   Component Date Value     COLOR,UA 12/13/2019 yellow     CLARITY,UA 12/13/2019 clear     SPECIFIC GRAVITY,UA 12/13/2019 >=1 030      PH,UA 12/13/2019 5 0     LEUKOCYTE ESTERASE,UA 12/13/2019 neg     NITRITE,UA 12/13/2019 neg     GLUCOSE, UA 12/13/2019 neg     KETONES,UA 12/13/2019 neg     BILIRUBIN,UA 12/13/2019 neg     BLOOD,UA 12/13/2019 neg     POCT URINE PROTEIN 12/13/2019 trace     SL AMB POCT UROBILINOGEN 12/13/2019 0 2          I have personally reviewed pertinent films in PACS    No current facility-administered medications for this visit

## 2020-03-02 NOTE — PROGRESS NOTES
[facility]  Progress Note - Michele Tejeda 62 y o  male MRN: [id number]    Unit/Bed#:  Encounter: [id number]    Assessment/Plan:    1  History of colon polyps-previous colonoscopy without any polyps  Patient was recommended a repeat at 5 year interval however patient reports that his previous gastroenterologist at [facility] had recommended sooner colonoscopy  Would recommend obtaining these records to see whether patient had any adenomatous/tubulovillous adenomatous polyps, if he has, would recommend a colonoscopy at this time  2  IBS D-appears to be well controlled, has occasional symptoms of incomplete evacuation, would recommend starting on fiber supplement such as Metamucil 1 tbsp on daily basis along with increase water intake  Patient should be taking probiotics on daily basis  -he has had history of small intestinal bacterial overgrowth in the past and has been on will Xifaxan and dicyclomine with symptomatic improvement many years ago  He was also on Viberzi 3 years ago which made symptoms worse  3  GERD-patient does not report dysphagia/odynophagia/loss of appetite  Does not recall having had diagnosis of Newman's esophagus   -continue famotidine on daily basis  Patient was explained about the lifestyle and dietary modifications  Advised to avoid fatty foods, chocolates, caffeine, alcohol and any other triggering foods  Avoid eating for at least 3 hours before going to bed  Subjective: This is a 59-year-old male with history of IBS D and GERD who presents for follow-up  He was previously seen by Anastasia Sosa in May of 2018  He has previously been on Xifaxan without much symptomatic relief, most recently he was on viberzi which made the symptoms worse  Patient reports that he has been doing well, he reports that he sometimes feels incomplete evacuation but denies any rectal bleeding or change in bowel habits    Patient does report that he has had history of colon polyps on previous colonoscopies by [facility]  I do not have the results of the previous colonoscopy from there however his last colonoscopy was in March of 2017 which was negative for microscopic colitis, there was evidence of diverticulosis and internal hemorrhoids  There were no polyps seen at the time  Patient was subsequently recommended 5 year follow-up  Patient reports that Dr Clarissa Flores is not aware of his previous history of colon polyps  He states that he was recommended to have colonoscopy every 3-5 years  No unintentional weight loss  Reports that acid reflux symptoms are well controlled, takes famotidine on as-needed basis  No dysphagia, odynophagia, nausea or vomiting  No unintentional weight loss or loss of appetite  Objective:     Vitals: Blood pressure 120/70, pulse 91, temperature 99 3 °F (37 4 °C), resp  rate 18, height 6' (1 829 m), weight 89 6 kg (197 lb 9 6 oz)  ,Body mass index is 26 8 kg/m²  [unfilled]    Physical Exam:    GEN: wn/wd, NAD  HEENT: MMM, no cervical or supraclavicular LAD, anciteric  CV: RRR, no m/r/g  CHEST: CTA b/l, no w/r/r  ABD: +BS, soft, NT/ND, no hepatosplenomegaly  EXT: no c/c/e  SKIN: no rashes  NEURO: aaox3      Invasive Devices     None                         Lab, Imaging and other studies:     No visits with results within 1 Day(s) from this visit     Latest known visit with results is:   Office Visit on 12/13/2019   Component Date Value     COLOR,UA 12/13/2019 yellow     CLARITY,UA 12/13/2019 clear     SPECIFIC GRAVITY,UA 12/13/2019 >=1 030      PH,UA 12/13/2019 5 0     LEUKOCYTE ESTERASE,UA 12/13/2019 neg     NITRITE,UA 12/13/2019 neg     GLUCOSE, UA 12/13/2019 neg     KETONES,UA 12/13/2019 neg     BILIRUBIN,UA 12/13/2019 neg     BLOOD,UA 12/13/2019 neg     POCT URINE PROTEIN 12/13/2019 trace     SL AMB POCT UROBILINOGEN 12/13/2019 0 2          I have personally reviewed pertinent films in PACS    No current facility-administered medications for this visit

## 2020-05-15 ENCOUNTER — TELEPHONE (OUTPATIENT)
Dept: GASTROENTEROLOGY | Facility: CLINIC | Age: 55
End: 2020-05-15

## 2020-05-15 DIAGNOSIS — K21.9 GASTROESOPHAGEAL REFLUX DISEASE, ESOPHAGITIS PRESENCE NOT SPECIFIED: ICD-10-CM

## 2020-05-15 RX ORDER — FAMOTIDINE 40 MG/1
40 TABLET, FILM COATED ORAL DAILY
Qty: 90 TABLET | Refills: 3 | Status: SHIPPED | OUTPATIENT
Start: 2020-05-15 | End: 2020-07-20

## 2020-07-17 DIAGNOSIS — K21.9 GASTROESOPHAGEAL REFLUX DISEASE, ESOPHAGITIS PRESENCE NOT SPECIFIED: ICD-10-CM

## 2020-07-20 RX ORDER — FAMOTIDINE 40 MG/1
TABLET, FILM COATED ORAL
Qty: 90 TABLET | Refills: 3 | Status: SHIPPED | OUTPATIENT
Start: 2020-07-20 | End: 2021-09-07

## 2020-07-20 NOTE — TELEPHONE ENCOUNTER
Spoke with pharmacy, they do not have any famotidine 20 mg or 40 mg available  Patient can either buy OTC in the meantime and pharmacy will contact him when they get the medication again  Patient can also check other pharmacies aside from [facility] to see if they have it as well

## 2020-07-21 NOTE — TELEPHONE ENCOUNTER
Patient is aware he has enough to hold him over for a little and then he will call them to see when shipment will come in

## 2021-01-12 ENCOUNTER — LAB (OUTPATIENT)
Dept: LAB | Facility: CLINIC | Age: 56
End: 2021-01-12
Payer: COMMERCIAL

## 2021-01-12 ENCOUNTER — TRANSCRIBE ORDERS (OUTPATIENT)
Dept: LAB | Facility: CLINIC | Age: 56
End: 2021-01-12

## 2021-01-12 DIAGNOSIS — Z13.9 SCREENING FOR UNSPECIFIED CONDITION: ICD-10-CM

## 2021-01-12 DIAGNOSIS — N52.9 ERECTILE DYSFUNCTION, UNSPECIFIED ERECTILE DYSFUNCTION TYPE: ICD-10-CM

## 2021-01-12 DIAGNOSIS — Z11.59 SCREENING EXAMINATION FOR POLIOMYELITIS: ICD-10-CM

## 2021-01-12 DIAGNOSIS — E29.1 3-OXO-5 ALPHA-STEROID DELTA 4-DEHYDROGENASE DEFICIENCY: ICD-10-CM

## 2021-01-12 DIAGNOSIS — K21.9 GASTROESOPHAGEAL REFLUX DISEASE: ICD-10-CM

## 2021-01-12 DIAGNOSIS — E78.00 PURE HYPERCHOLESTEROLEMIA: Primary | ICD-10-CM

## 2021-01-12 DIAGNOSIS — Z12.5 SPECIAL SCREENING FOR MALIGNANT NEOPLASM OF PROSTATE: ICD-10-CM

## 2021-01-12 DIAGNOSIS — E78.00 PURE HYPERCHOLESTEROLEMIA: ICD-10-CM

## 2021-01-12 LAB
ALBUMIN SERPL BCP-MCNC: 4.1 G/DL (ref 3.5–5)
ALP SERPL-CCNC: 58 U/L (ref 46–116)
ALT SERPL W P-5'-P-CCNC: 230 U/L (ref 12–78)
ANION GAP SERPL CALCULATED.3IONS-SCNC: 4 MMOL/L (ref 4–13)
AST SERPL W P-5'-P-CCNC: 137 U/L (ref 5–45)
BILIRUB SERPL-MCNC: 0.95 MG/DL (ref 0.2–1)
BUN SERPL-MCNC: 19 MG/DL (ref 5–25)
CALCIUM SERPL-MCNC: 8.9 MG/DL (ref 8.3–10.1)
CHLORIDE SERPL-SCNC: 105 MMOL/L (ref 100–108)
CHOLEST SERPL-MCNC: 249 MG/DL (ref 50–200)
CO2 SERPL-SCNC: 29 MMOL/L (ref 21–32)
CREAT SERPL-MCNC: 1.18 MG/DL (ref 0.6–1.3)
ERYTHROCYTE [DISTWIDTH] IN BLOOD BY AUTOMATED COUNT: 13.1 % (ref 11.6–15.1)
GFR SERPL CREATININE-BSD FRML MDRD: 68 ML/MIN/1.73SQ M
GLUCOSE P FAST SERPL-MCNC: 102 MG/DL (ref 65–99)
HCT VFR BLD AUTO: 45.6 % (ref 36.5–49.3)
HCV AB SER QL: NORMAL
HDLC SERPL-MCNC: 49 MG/DL
HGB BLD-MCNC: 15.3 G/DL (ref 12–17)
LDLC SERPL CALC-MCNC: 166 MG/DL (ref 0–100)
MCH RBC QN AUTO: 32.7 PG (ref 26.8–34.3)
MCHC RBC AUTO-ENTMCNC: 33.6 G/DL (ref 31.4–37.4)
MCV RBC AUTO: 97 FL (ref 82–98)
NONHDLC SERPL-MCNC: 200 MG/DL
PLATELET # BLD AUTO: 230 THOUSANDS/UL (ref 149–390)
PMV BLD AUTO: 9.9 FL (ref 8.9–12.7)
POTASSIUM SERPL-SCNC: 3.8 MMOL/L (ref 3.5–5.3)
PROT SERPL-MCNC: 7.6 G/DL (ref 6.4–8.2)
PSA SERPL-MCNC: 2.2 NG/ML (ref 0–4)
RBC # BLD AUTO: 4.68 MILLION/UL (ref 3.88–5.62)
SODIUM SERPL-SCNC: 138 MMOL/L (ref 136–145)
TESTOST SERPL-MCNC: 460 NG/DL (ref 95–948)
TRIGL SERPL-MCNC: 170 MG/DL
WBC # BLD AUTO: 5.84 THOUSAND/UL (ref 4.31–10.16)

## 2021-01-12 PROCEDURE — 80053 COMPREHEN METABOLIC PANEL: CPT

## 2021-01-12 PROCEDURE — 86803 HEPATITIS C AB TEST: CPT

## 2021-01-12 PROCEDURE — 84153 ASSAY OF PSA TOTAL: CPT

## 2021-01-12 PROCEDURE — 36415 COLL VENOUS BLD VENIPUNCTURE: CPT

## 2021-01-12 PROCEDURE — 85027 COMPLETE CBC AUTOMATED: CPT

## 2021-01-12 PROCEDURE — 80061 LIPID PANEL: CPT

## 2021-01-12 PROCEDURE — 84403 ASSAY OF TOTAL TESTOSTERONE: CPT

## 2021-01-14 RX ORDER — SILDENAFIL 100 MG/1
TABLET, FILM COATED ORAL
Qty: 30 TABLET | Refills: 1 | Status: SHIPPED | OUTPATIENT
Start: 2021-01-14 | End: 2021-04-06 | Stop reason: SDUPTHER

## 2021-01-14 NOTE — TELEPHONE ENCOUNTER
The patient has an upcoming office visit scheduled for 4/6/21 with Dr Deyanira Childress in the [facility] location but will run out of medication until then    Request for same, 30 count supply with 1 refill was queued and forwarded to the Advanced Practitioner covering the [facility] location for approval

## 2021-04-06 ENCOUNTER — OFFICE VISIT (OUTPATIENT)
Dept: UROLOGY | Facility: MEDICAL CENTER | Age: 56
End: 2021-04-06
Payer: COMMERCIAL

## 2021-04-06 VITALS
HEART RATE: 87 BPM | WEIGHT: 213 LBS | SYSTOLIC BLOOD PRESSURE: 124 MMHG | BODY MASS INDEX: 28.23 KG/M2 | DIASTOLIC BLOOD PRESSURE: 82 MMHG | HEIGHT: 73 IN

## 2021-04-06 DIAGNOSIS — N52.9 ERECTILE DYSFUNCTION, UNSPECIFIED ERECTILE DYSFUNCTION TYPE: ICD-10-CM

## 2021-04-06 DIAGNOSIS — N40.0 BPH WITHOUT OBSTRUCTION/LOWER URINARY TRACT SYMPTOMS: Primary | ICD-10-CM

## 2021-04-06 PROCEDURE — 99214 OFFICE O/P EST MOD 30 MIN: CPT | Performed by: UROLOGY

## 2021-04-06 RX ORDER — SILDENAFIL 100 MG/1
TABLET, FILM COATED ORAL
Qty: 30 TABLET | Refills: 11 | Status: SHIPPED | OUTPATIENT
Start: 2021-04-06 | End: 2021-04-06 | Stop reason: SDUPTHER

## 2021-04-06 RX ORDER — FLUTICASONE PROPIONATE AND SALMETEROL XINAFOATE 115; 21 UG/1; UG/1
AEROSOL, METERED RESPIRATORY (INHALATION)
Status: ON HOLD | COMMUNITY
Start: 2021-03-14 | End: 2021-12-12 | Stop reason: ALTCHOICE

## 2021-04-06 RX ORDER — SILDENAFIL 100 MG/1
TABLET, FILM COATED ORAL
Qty: 30 TABLET | Refills: 11 | Status: SHIPPED | OUTPATIENT
Start: 2021-04-06 | End: 2021-12-20 | Stop reason: HOSPADM

## 2021-04-06 NOTE — PROGRESS NOTES
Assessment/Plan:  1  BPH without obstructive symptoms- no intervention  The patient's PSA has increased to 2 2 and this will be watched however it is still within range of normal   Repeat PSA TG 1 year  2  Erectile dysfunction -responding to sildenafil 100 mg-prescription renewed  No problem-specific Assessment & Plan notes found for this encounter  Diagnoses and all orders for this visit:    BPH without obstruction/lower urinary tract symptoms  -     PSA Total, Diagnostic; Future    Erectile dysfunction, unspecified erectile dysfunction type  -     sildenafil (VIAGRA) 100 mg tablet; Take 1 tablet by mouth one (1) prior to intercourse on an empty stomach  -     Comprehensive metabolic panel; Future    Other orders  -     Advair -21 MCG/ACT inhaler; 2 PUFFS 2 PUFFS TWICE A DAY FOR 30 DAYS          Subjective:      Patient ID: José Miguel Falk is a 60 y o  male  HPI   49-year-old male with past history of bilateral vasectomy treated for erectile dysfunction responding nicely to sildenafil 100 mg as needed presents for prostate exam   PSA is jumped from just over 1-2 2  The patient is otherwise voiding well  He denies dysuria gross hematuria  The following portions of the patient's history were reviewed and updated as appropriate: allergies, current medications, past family history, past medical history, past social history, past surgical history and problem list     Review of Systems   All other systems reviewed and are negative  Objective:      /82   Pulse 87   Ht 6' 0 5" (1 842 m)   Wt 96 6 kg (213 lb)   BMI 28 49 kg/m²          Physical Exam  Vitals signs reviewed  Constitutional:       General: He is not in acute distress  Appearance: Normal appearance  He is not ill-appearing, toxic-appearing or diaphoretic  HENT:      Head: Normocephalic and atraumatic        Nose: Nose normal       Mouth/Throat:      Mouth: Mucous membranes are moist    Eyes: Extraocular Movements: Extraocular movements intact  Neck:      Musculoskeletal: Normal range of motion  Pulmonary:      Effort: Pulmonary effort is normal  No respiratory distress  Genitourinary:     Penis: Normal        Scrotum/Testes: Normal       Rectum: Normal       Comments:  GT normal anal verge normal anal sphincter tone no palpable rectal masses  Prostate 30 g a nodular nontender  Foreskin redundant phallus normal meatus patent normally placed  Scrotum normal without abnormality  Musculoskeletal: Normal range of motion  Skin:     General: Skin is dry  Neurological:      General: No focal deficit present  Mental Status: He is alert and oriented to person, place, and time  Mental status is at baseline  Psychiatric:         Mood and Affect: Mood normal          Behavior: Behavior normal          Thought Content:  Thought content normal          Judgment: Judgment normal

## 2021-07-07 ENCOUNTER — APPOINTMENT (OUTPATIENT)
Dept: RADIOLOGY | Facility: CLINIC | Age: 56
End: 2021-07-07
Payer: COMMERCIAL

## 2021-07-07 DIAGNOSIS — M65.9 SAPHO SYNDROME (HCC): ICD-10-CM

## 2021-07-07 DIAGNOSIS — L70.9 SAPHO SYNDROME (HCC): ICD-10-CM

## 2021-07-07 DIAGNOSIS — M86.9 SAPHO SYNDROME (HCC): ICD-10-CM

## 2021-07-07 DIAGNOSIS — M85.80 SAPHO SYNDROME (HCC): ICD-10-CM

## 2021-07-07 DIAGNOSIS — L40.3 SAPHO SYNDROME (HCC): ICD-10-CM

## 2021-07-07 PROCEDURE — 71046 X-RAY EXAM CHEST 2 VIEWS: CPT

## 2021-07-07 PROCEDURE — 71100 X-RAY EXAM RIBS UNI 2 VIEWS: CPT

## 2021-09-07 DIAGNOSIS — K21.9 GASTROESOPHAGEAL REFLUX DISEASE WITHOUT ESOPHAGITIS: Primary | ICD-10-CM

## 2021-09-07 RX ORDER — FAMOTIDINE 40 MG/1
40 TABLET, FILM COATED ORAL DAILY
Qty: 30 TABLET | Refills: 2 | Status: SHIPPED | OUTPATIENT
Start: 2021-09-07 | End: 2021-12-09

## 2021-11-03 ENCOUNTER — OFFICE VISIT (OUTPATIENT)
Dept: GASTROENTEROLOGY | Facility: AMBULARY SURGERY CENTER | Age: 56
End: 2021-11-03
Payer: COMMERCIAL

## 2021-11-03 VITALS
SYSTOLIC BLOOD PRESSURE: 144 MMHG | BODY MASS INDEX: 30.31 KG/M2 | HEIGHT: 72 IN | WEIGHT: 223.8 LBS | DIASTOLIC BLOOD PRESSURE: 80 MMHG

## 2021-11-03 DIAGNOSIS — R74.8 ELEVATED LIVER ENZYMES: ICD-10-CM

## 2021-11-03 DIAGNOSIS — K21.9 GASTROESOPHAGEAL REFLUX DISEASE, UNSPECIFIED WHETHER ESOPHAGITIS PRESENT: ICD-10-CM

## 2021-11-03 DIAGNOSIS — K76.9 LIVER DISEASE: ICD-10-CM

## 2021-11-03 DIAGNOSIS — Z86.010 HISTORY OF COLON POLYPS: Primary | ICD-10-CM

## 2021-11-03 DIAGNOSIS — R19.8 TENESMUS: ICD-10-CM

## 2021-11-03 PROBLEM — Z86.0100 HISTORY OF COLON POLYPS: Status: ACTIVE | Noted: 2021-11-03

## 2021-11-03 PROCEDURE — 99214 OFFICE O/P EST MOD 30 MIN: CPT | Performed by: PHYSICIAN ASSISTANT

## 2021-11-03 RX ORDER — ZINC GLUCONATE 50 MG
50 TABLET ORAL DAILY
Status: ON HOLD | COMMUNITY
End: 2021-12-12 | Stop reason: ALTCHOICE

## 2021-11-03 RX ORDER — SOD SULF/POT CHLORIDE/MAG SULF 1.479 G
TABLET ORAL
Qty: 24 TABLET | Refills: 0 | Status: ON HOLD | OUTPATIENT
Start: 2021-11-03 | End: 2021-12-12 | Stop reason: ALTCHOICE

## 2021-11-03 RX ORDER — ALBUTEROL SULFATE 90 UG/1
AEROSOL, METERED RESPIRATORY (INHALATION)
COMMUNITY
Start: 2021-09-26

## 2021-11-04 DIAGNOSIS — Z86.010 HISTORY OF COLON POLYPS: ICD-10-CM

## 2021-11-04 DIAGNOSIS — R19.8 TENESMUS: ICD-10-CM

## 2021-11-05 RX ORDER — SOD SULF/POT CHLORIDE/MAG SULF 1.479 G
TABLET ORAL
Qty: 24 TABLET | Refills: 0 | OUTPATIENT
Start: 2021-11-05

## 2021-11-30 ENCOUNTER — TRANSCRIBE ORDERS (OUTPATIENT)
Dept: URGENT CARE | Facility: CLINIC | Age: 56
End: 2021-11-30

## 2021-11-30 DIAGNOSIS — J91.8 PLEURAL EFFUSION ASSOCIATED WITH HEPATIC DISORDER: Primary | ICD-10-CM

## 2021-11-30 DIAGNOSIS — R73.01 IMPAIRED FASTING GLUCOSE: ICD-10-CM

## 2021-11-30 DIAGNOSIS — K76.9 PLEURAL EFFUSION ASSOCIATED WITH HEPATIC DISORDER: Primary | ICD-10-CM

## 2021-11-30 DIAGNOSIS — E78.00 PURE HYPERCHOLESTEROLEMIA: ICD-10-CM

## 2021-11-30 DIAGNOSIS — K75.9 RADIATION HEPATITIS: ICD-10-CM

## 2021-11-30 DIAGNOSIS — Z11.59 SCREENING EXAMINATION FOR POLIOMYELITIS: ICD-10-CM

## 2021-11-30 PROCEDURE — U0003 INFECTIOUS AGENT DETECTION BY NUCLEIC ACID (DNA OR RNA); SEVERE ACUTE RESPIRATORY SYNDROME CORONAVIRUS 2 (SARS-COV-2) (CORONAVIRUS DISEASE [COVID-19]), AMPLIFIED PROBE TECHNIQUE, MAKING USE OF HIGH THROUGHPUT TECHNOLOGIES AS DESCRIBED BY CMS-2020-01-R: HCPCS | Performed by: FAMILY MEDICINE

## 2021-11-30 PROCEDURE — U0005 INFEC AGEN DETEC AMPLI PROBE: HCPCS | Performed by: FAMILY MEDICINE

## 2021-12-09 DIAGNOSIS — K21.9 GASTROESOPHAGEAL REFLUX DISEASE WITHOUT ESOPHAGITIS: ICD-10-CM

## 2021-12-09 RX ORDER — FAMOTIDINE 40 MG/1
TABLET, FILM COATED ORAL
Qty: 30 TABLET | Refills: 2 | Status: SHIPPED | OUTPATIENT
Start: 2021-12-09 | End: 2022-05-08

## 2021-12-10 ENCOUNTER — HOSPITAL ENCOUNTER (INPATIENT)
Facility: HOSPITAL | Age: 56
LOS: 1 days | DRG: 177 | End: 2021-12-12
Attending: EMERGENCY MEDICINE | Admitting: INTERNAL MEDICINE
Payer: COMMERCIAL

## 2021-12-10 ENCOUNTER — APPOINTMENT (EMERGENCY)
Dept: RADIOLOGY | Facility: HOSPITAL | Age: 56
DRG: 177 | End: 2021-12-10
Payer: COMMERCIAL

## 2021-12-10 DIAGNOSIS — U07.1 COVID: Primary | ICD-10-CM

## 2021-12-10 DIAGNOSIS — R09.02 HYPOXIA: ICD-10-CM

## 2021-12-10 DIAGNOSIS — N28.9 RENAL INSUFFICIENCY: ICD-10-CM

## 2021-12-10 PROBLEM — R74.01 TRANSAMINITIS: Status: ACTIVE | Noted: 2021-11-03

## 2021-12-10 PROBLEM — E87.2 HIGH ANION GAP METABOLIC ACIDOSIS: Status: ACTIVE | Noted: 2021-12-10

## 2021-12-10 PROBLEM — E87.29 HIGH ANION GAP METABOLIC ACIDOSIS: Status: ACTIVE | Noted: 2021-12-10

## 2021-12-10 PROBLEM — N17.9 AKI (ACUTE KIDNEY INJURY) (HCC): Status: ACTIVE | Noted: 2021-12-10

## 2021-12-10 LAB
2HR DELTA HS TROPONIN: 1 NG/L
4HR DELTA HS TROPONIN: -1 NG/L
ALBUMIN SERPL BCP-MCNC: 3.7 G/DL (ref 3.4–4.8)
ALP SERPL-CCNC: 57 U/L (ref 10–129)
ALT SERPL W P-5'-P-CCNC: 205 U/L (ref 5–63)
ANION GAP SERPL CALCULATED.3IONS-SCNC: 16 MMOL/L (ref 4–13)
APTT PPP: 202 SECONDS (ref 23–37)
APTT PPP: 31 SECONDS (ref 23–37)
APTT PPP: 32 SECONDS (ref 23–37)
AST SERPL W P-5'-P-CCNC: 178 U/L (ref 15–41)
BACTERIA UR QL AUTO: ABNORMAL /HPF
BASE EX.OXY STD BLDV CALC-SCNC: 32 % (ref 60–80)
BASE EXCESS BLDV CALC-SCNC: -5.1 MMOL/L
BASOPHILS # BLD MANUAL: 0 THOUSAND/UL (ref 0–0.1)
BASOPHILS NFR MAR MANUAL: 0 % (ref 0–1)
BILIRUB SERPL-MCNC: 0.89 MG/DL (ref 0.3–1.2)
BILIRUB UR QL STRIP: NEGATIVE
BNP SERPL-MCNC: 36.3 PG/ML (ref 1–100)
BUN SERPL-MCNC: 64 MG/DL (ref 6–20)
CALCIUM SERPL-MCNC: 8.1 MG/DL (ref 8.4–10.2)
CARDIAC TROPONIN I PNL SERPL HS: 10 NG/L
CARDIAC TROPONIN I PNL SERPL HS: 11 NG/L
CARDIAC TROPONIN I PNL SERPL HS: 9 NG/L
CHLORIDE SERPL-SCNC: 101 MMOL/L (ref 96–108)
CK MB SERPL-MCNC: 1.3 % (ref 0–2.5)
CK MB SERPL-MCNC: 3.6 NG/ML (ref 0.1–5.9)
CK SERPL-CCNC: 271.2 U/L (ref 49–397)
CLARITY UR: ABNORMAL
CO2 SERPL-SCNC: 15 MMOL/L (ref 22–33)
COLOR UR: YELLOW
CREAT SERPL-MCNC: 2.92 MG/DL (ref 0.5–1.2)
CRP SERPL QL: 17.3 MG/DL (ref 0–1)
D DIMER PPP FEU-MCNC: 1.55 UG/ML FEU
EOSINOPHIL # BLD MANUAL: 0 THOUSAND/UL (ref 0–0.4)
EOSINOPHIL NFR BLD MANUAL: 0 % (ref 0–6)
ERYTHROCYTE [DISTWIDTH] IN BLOOD BY AUTOMATED COUNT: 13.3 % (ref 11.6–15.1)
ERYTHROCYTE [DISTWIDTH] IN BLOOD BY AUTOMATED COUNT: 13.4 % (ref 11.6–15.1)
GFR SERPL CREATININE-BSD FRML MDRD: 23 ML/MIN/1.73SQ M
GLUCOSE SERPL-MCNC: 106 MG/DL (ref 65–140)
GLUCOSE UR STRIP-MCNC: NEGATIVE MG/DL
HCO3 BLDV-SCNC: 19.5 MMOL/L (ref 24–30)
HCT VFR BLD AUTO: 46.7 % (ref 36.5–49.3)
HCT VFR BLD AUTO: 49 % (ref 36.5–49.3)
HGB BLD-MCNC: 16.7 G/DL (ref 12–17)
HGB BLD-MCNC: 17.4 G/DL (ref 12–17)
HGB UR QL STRIP.AUTO: ABNORMAL
HYALINE CASTS #/AREA URNS LPF: ABNORMAL /LPF
INR PPP: 1.08 (ref 0.84–1.19)
INR PPP: 1.13 (ref 0.84–1.19)
KETONES UR STRIP-MCNC: NEGATIVE MG/DL
LACTATE SERPL-SCNC: 1.2 MMOL/L (ref 0–2)
LEUKOCYTE ESTERASE UR QL STRIP: NEGATIVE
LYMPHOCYTES # BLD AUTO: 0.76 THOUSAND/UL (ref 0.6–4.47)
LYMPHOCYTES # BLD AUTO: 9 % (ref 14–44)
MCH RBC QN AUTO: 32.3 PG (ref 26.8–34.3)
MCH RBC QN AUTO: 32.3 PG (ref 26.8–34.3)
MCHC RBC AUTO-ENTMCNC: 35.5 G/DL (ref 31.4–37.4)
MCHC RBC AUTO-ENTMCNC: 35.8 G/DL (ref 31.4–37.4)
MCV RBC AUTO: 90 FL (ref 82–98)
MCV RBC AUTO: 91 FL (ref 82–98)
MONOCYTES # BLD AUTO: 0.34 THOUSAND/UL (ref 0–1.22)
MONOCYTES NFR BLD: 4 % (ref 4–12)
NEUTROPHILS # BLD MANUAL: 7.37 THOUSAND/UL (ref 1.85–7.62)
NEUTS BAND NFR BLD MANUAL: 2 % (ref 0–8)
NEUTS SEG NFR BLD AUTO: 85 % (ref 43–75)
NITRITE UR QL STRIP: NEGATIVE
NON-SQ EPI CELLS URNS QL MICRO: ABNORMAL /HPF
O2 CT BLDV-SCNC: 8.1 ML/DL
PCO2 BLDV: 35.9 MM HG (ref 42–50)
PH BLDV: 7.35 [PH] (ref 7.3–7.4)
PH UR STRIP.AUTO: 5.5 [PH]
PLATELET # BLD AUTO: 263 THOUSANDS/UL (ref 149–390)
PLATELET # BLD AUTO: 267 THOUSANDS/UL (ref 149–390)
PLATELET BLD QL SMEAR: ADEQUATE
PMV BLD AUTO: 10.4 FL (ref 8.9–12.7)
PMV BLD AUTO: 10.6 FL (ref 8.9–12.7)
PO2 BLDV: 20.8 MM HG (ref 35–45)
POTASSIUM SERPL-SCNC: 3.8 MMOL/L (ref 3.5–5)
PROT SERPL-MCNC: 7.9 G/DL (ref 6.4–8.3)
PROT UR STRIP-MCNC: ABNORMAL MG/DL
PROTHROMBIN TIME: 13.9 SECONDS (ref 11.6–14.5)
PROTHROMBIN TIME: 14.4 SECONDS (ref 11.6–14.5)
RBC # BLD AUTO: 5.17 MILLION/UL (ref 3.88–5.62)
RBC # BLD AUTO: 5.39 MILLION/UL (ref 3.88–5.62)
RBC #/AREA URNS AUTO: ABNORMAL /HPF
RBC MORPH BLD: NORMAL
SODIUM SERPL-SCNC: 132 MMOL/L (ref 133–145)
SP GR UR STRIP.AUTO: 1.02 (ref 1–1.03)
UROBILINOGEN UR QL STRIP.AUTO: 0.2 E.U./DL
WBC # BLD AUTO: 8.47 THOUSAND/UL (ref 4.31–10.16)
WBC # BLD AUTO: 8.69 THOUSAND/UL (ref 4.31–10.16)
WBC #/AREA URNS AUTO: ABNORMAL /HPF

## 2021-12-10 PROCEDURE — 85610 PROTHROMBIN TIME: CPT | Performed by: EMERGENCY MEDICINE

## 2021-12-10 PROCEDURE — 84484 ASSAY OF TROPONIN QUANT: CPT | Performed by: EMERGENCY MEDICINE

## 2021-12-10 PROCEDURE — 85379 FIBRIN DEGRADATION QUANT: CPT | Performed by: INTERNAL MEDICINE

## 2021-12-10 PROCEDURE — 83880 ASSAY OF NATRIURETIC PEPTIDE: CPT | Performed by: EMERGENCY MEDICINE

## 2021-12-10 PROCEDURE — XW033E5 INTRODUCTION OF REMDESIVIR ANTI-INFECTIVE INTO PERIPHERAL VEIN, PERCUTANEOUS APPROACH, NEW TECHNOLOGY GROUP 5: ICD-10-PCS | Performed by: INTERNAL MEDICINE

## 2021-12-10 PROCEDURE — 82805 BLOOD GASES W/O2 SATURATION: CPT | Performed by: EMERGENCY MEDICINE

## 2021-12-10 PROCEDURE — 86140 C-REACTIVE PROTEIN: CPT | Performed by: INTERNAL MEDICINE

## 2021-12-10 PROCEDURE — 81001 URINALYSIS AUTO W/SCOPE: CPT | Performed by: EMERGENCY MEDICINE

## 2021-12-10 PROCEDURE — 94760 N-INVAS EAR/PLS OXIMETRY 1: CPT

## 2021-12-10 PROCEDURE — 83605 ASSAY OF LACTIC ACID: CPT | Performed by: EMERGENCY MEDICINE

## 2021-12-10 PROCEDURE — 85027 COMPLETE CBC AUTOMATED: CPT | Performed by: EMERGENCY MEDICINE

## 2021-12-10 PROCEDURE — 82553 CREATINE MB FRACTION: CPT | Performed by: INTERNAL MEDICINE

## 2021-12-10 PROCEDURE — 36415 COLL VENOUS BLD VENIPUNCTURE: CPT | Performed by: EMERGENCY MEDICINE

## 2021-12-10 PROCEDURE — 82550 ASSAY OF CK (CPK): CPT | Performed by: INTERNAL MEDICINE

## 2021-12-10 PROCEDURE — 99285 EMERGENCY DEPT VISIT HI MDM: CPT | Performed by: EMERGENCY MEDICINE

## 2021-12-10 PROCEDURE — 84145 PROCALCITONIN (PCT): CPT | Performed by: INTERNAL MEDICINE

## 2021-12-10 PROCEDURE — 94640 AIRWAY INHALATION TREATMENT: CPT

## 2021-12-10 PROCEDURE — 87086 URINE CULTURE/COLONY COUNT: CPT | Performed by: EMERGENCY MEDICINE

## 2021-12-10 PROCEDURE — XW0DXM6 INTRODUCTION OF BARICITINIB INTO MOUTH AND PHARYNX, EXTERNAL APPROACH, NEW TECHNOLOGY GROUP 6: ICD-10-PCS | Performed by: INTERNAL MEDICINE

## 2021-12-10 PROCEDURE — 80053 COMPREHEN METABOLIC PANEL: CPT | Performed by: EMERGENCY MEDICINE

## 2021-12-10 PROCEDURE — 85007 BL SMEAR W/DIFF WBC COUNT: CPT | Performed by: EMERGENCY MEDICINE

## 2021-12-10 PROCEDURE — 87040 BLOOD CULTURE FOR BACTERIA: CPT | Performed by: EMERGENCY MEDICINE

## 2021-12-10 PROCEDURE — 93005 ELECTROCARDIOGRAM TRACING: CPT

## 2021-12-10 PROCEDURE — 96360 HYDRATION IV INFUSION INIT: CPT

## 2021-12-10 PROCEDURE — 71045 X-RAY EXAM CHEST 1 VIEW: CPT

## 2021-12-10 PROCEDURE — 99285 EMERGENCY DEPT VISIT HI MDM: CPT

## 2021-12-10 PROCEDURE — 85730 THROMBOPLASTIN TIME PARTIAL: CPT | Performed by: EMERGENCY MEDICINE

## 2021-12-10 RX ORDER — ASPIRIN 81 MG/1
324 TABLET, CHEWABLE ORAL ONCE
Status: COMPLETED | OUTPATIENT
Start: 2021-12-10 | End: 2021-12-10

## 2021-12-10 RX ORDER — METOPROLOL SUCCINATE 25 MG/1
25 TABLET, EXTENDED RELEASE ORAL DAILY
Status: DISCONTINUED | OUTPATIENT
Start: 2021-12-11 | End: 2021-12-10

## 2021-12-10 RX ORDER — DEXAMETHASONE SODIUM PHOSPHATE 4 MG/ML
6 INJECTION, SOLUTION INTRA-ARTICULAR; INTRALESIONAL; INTRAMUSCULAR; INTRAVENOUS; SOFT TISSUE EVERY 24 HOURS
Status: DISCONTINUED | OUTPATIENT
Start: 2021-12-11 | End: 2021-12-12

## 2021-12-10 RX ORDER — ATORVASTATIN CALCIUM 40 MG/1
40 TABLET, FILM COATED ORAL
Status: DISCONTINUED | OUTPATIENT
Start: 2021-12-10 | End: 2021-12-10

## 2021-12-10 RX ORDER — FAMOTIDINE 20 MG/1
40 TABLET, FILM COATED ORAL DAILY
Status: DISCONTINUED | OUTPATIENT
Start: 2021-12-11 | End: 2021-12-10 | Stop reason: DRUGHIGH

## 2021-12-10 RX ORDER — DEXAMETHASONE SODIUM PHOSPHATE 4 MG/ML
8 INJECTION, SOLUTION INTRA-ARTICULAR; INTRALESIONAL; INTRAMUSCULAR; INTRAVENOUS; SOFT TISSUE ONCE
Status: COMPLETED | OUTPATIENT
Start: 2021-12-10 | End: 2021-12-10

## 2021-12-10 RX ORDER — ALPRAZOLAM 0.5 MG/1
0.5 TABLET ORAL 2 TIMES DAILY PRN
Status: DISCONTINUED | OUTPATIENT
Start: 2021-12-10 | End: 2021-12-10

## 2021-12-10 RX ORDER — HEPARIN SODIUM 1000 [USP'U]/ML
8000 INJECTION, SOLUTION INTRAVENOUS; SUBCUTANEOUS ONCE
Status: COMPLETED | OUTPATIENT
Start: 2021-12-10 | End: 2021-12-10

## 2021-12-10 RX ORDER — SODIUM CHLORIDE 9 MG/ML
75 INJECTION, SOLUTION INTRAVENOUS CONTINUOUS
Status: DISCONTINUED | OUTPATIENT
Start: 2021-12-10 | End: 2021-12-12 | Stop reason: HOSPADM

## 2021-12-10 RX ORDER — BUDESONIDE AND FORMOTEROL FUMARATE DIHYDRATE 160; 4.5 UG/1; UG/1
2 AEROSOL RESPIRATORY (INHALATION)
Status: DISCONTINUED | OUTPATIENT
Start: 2021-12-10 | End: 2021-12-12 | Stop reason: HOSPADM

## 2021-12-10 RX ORDER — FAMOTIDINE 20 MG/1
20 TABLET, FILM COATED ORAL DAILY
Status: DISCONTINUED | OUTPATIENT
Start: 2021-12-11 | End: 2021-12-12 | Stop reason: HOSPADM

## 2021-12-10 RX ORDER — DOXYCYCLINE HYCLATE 100 MG/1
100 CAPSULE ORAL EVERY 12 HOURS SCHEDULED
Status: DISCONTINUED | OUTPATIENT
Start: 2021-12-10 | End: 2021-12-12 | Stop reason: HOSPADM

## 2021-12-10 RX ORDER — HEPARIN SODIUM 10000 [USP'U]/100ML
3-30 INJECTION, SOLUTION INTRAVENOUS
Status: DISCONTINUED | OUTPATIENT
Start: 2021-12-10 | End: 2021-12-12

## 2021-12-10 RX ORDER — CEFTRIAXONE 1 G/50ML
1000 INJECTION, SOLUTION INTRAVENOUS EVERY 24 HOURS
Status: DISCONTINUED | OUTPATIENT
Start: 2021-12-10 | End: 2021-12-12 | Stop reason: HOSPADM

## 2021-12-10 RX ADMIN — SODIUM CHLORIDE 1000 ML: 0.9 INJECTION, SOLUTION INTRAVENOUS at 13:17

## 2021-12-10 RX ADMIN — REMDESIVIR 200 MG: 100 INJECTION, POWDER, LYOPHILIZED, FOR SOLUTION INTRAVENOUS at 16:22

## 2021-12-10 RX ADMIN — BUDESONIDE AND FORMOTEROL FUMARATE DIHYDRATE 2 PUFF: 160; 4.5 AEROSOL RESPIRATORY (INHALATION) at 21:37

## 2021-12-10 RX ADMIN — DEXAMETHASONE SODIUM PHOSPHATE 8 MG: 4 INJECTION, SOLUTION INTRA-ARTICULAR; INTRALESIONAL; INTRAMUSCULAR; INTRAVENOUS; SOFT TISSUE at 14:50

## 2021-12-10 RX ADMIN — HEPARIN SODIUM 8000 UNITS: 1000 INJECTION INTRAVENOUS; SUBCUTANEOUS at 14:53

## 2021-12-10 RX ADMIN — HEPARIN SODIUM 18 UNITS/KG/HR: 10000 INJECTION, SOLUTION INTRAVENOUS at 15:05

## 2021-12-10 RX ADMIN — ASPIRIN 324 MG: 81 TABLET, CHEWABLE ORAL at 14:47

## 2021-12-10 RX ADMIN — CEFTRIAXONE 1000 MG: 1 INJECTION, SOLUTION INTRAVENOUS at 17:16

## 2021-12-10 RX ADMIN — DOXYCYCLINE 100 MG: 100 CAPSULE ORAL at 16:21

## 2021-12-10 RX ADMIN — HEPARIN SODIUM 18 UNITS/KG/HR: 10000 INJECTION, SOLUTION INTRAVENOUS at 14:52

## 2021-12-10 RX ADMIN — SODIUM CHLORIDE 75 ML/HR: 0.9 INJECTION, SOLUTION INTRAVENOUS at 16:19

## 2021-12-11 LAB
ALBUMIN SERPL BCP-MCNC: 3.5 G/DL (ref 3.4–4.8)
ALP SERPL-CCNC: 65.6 U/L (ref 10–129)
ALT SERPL W P-5'-P-CCNC: 162 U/L (ref 5–63)
ANION GAP SERPL CALCULATED.3IONS-SCNC: 14 MMOL/L (ref 4–13)
APTT PPP: 28 SECONDS (ref 23–37)
APTT PPP: 36 SECONDS (ref 23–37)
APTT PPP: 76 SECONDS (ref 23–37)
AST SERPL W P-5'-P-CCNC: 121 U/L (ref 15–41)
BACTERIA UR CULT: NORMAL
BILIRUB SERPL-MCNC: 0.68 MG/DL (ref 0.3–1.2)
BUN SERPL-MCNC: 56 MG/DL (ref 6–20)
CALCIUM SERPL-MCNC: 8.1 MG/DL (ref 8.4–10.2)
CHLORIDE SERPL-SCNC: 105 MMOL/L (ref 96–108)
CO2 SERPL-SCNC: 17 MMOL/L (ref 22–33)
CREAT SERPL-MCNC: 1.95 MG/DL (ref 0.5–1.2)
ERYTHROCYTE [DISTWIDTH] IN BLOOD BY AUTOMATED COUNT: 13.5 % (ref 11.6–15.1)
GFR SERPL CREATININE-BSD FRML MDRD: 37 ML/MIN/1.73SQ M
GLUCOSE P FAST SERPL-MCNC: 155 MG/DL (ref 70–105)
GLUCOSE SERPL-MCNC: 155 MG/DL (ref 65–140)
HCT VFR BLD AUTO: 45.3 % (ref 36.5–49.3)
HGB BLD-MCNC: 15.7 G/DL (ref 12–17)
MCH RBC QN AUTO: 32.2 PG (ref 26.8–34.3)
MCHC RBC AUTO-ENTMCNC: 34.7 G/DL (ref 31.4–37.4)
MCV RBC AUTO: 93 FL (ref 82–98)
PLATELET # BLD AUTO: 286 THOUSANDS/UL (ref 149–390)
PMV BLD AUTO: 10.6 FL (ref 8.9–12.7)
POTASSIUM SERPL-SCNC: 4 MMOL/L (ref 3.5–5)
PROCALCITONIN SERPL-MCNC: 2.59 NG/ML
PROT SERPL-MCNC: 7.5 G/DL (ref 6.4–8.3)
RBC # BLD AUTO: 4.88 MILLION/UL (ref 3.88–5.62)
SODIUM SERPL-SCNC: 136 MMOL/L (ref 133–145)
WBC # BLD AUTO: 5.81 THOUSAND/UL (ref 4.31–10.16)

## 2021-12-11 PROCEDURE — 86803 HEPATITIS C AB TEST: CPT | Performed by: INTERNAL MEDICINE

## 2021-12-11 PROCEDURE — 85027 COMPLETE CBC AUTOMATED: CPT | Performed by: INTERNAL MEDICINE

## 2021-12-11 PROCEDURE — 84145 PROCALCITONIN (PCT): CPT | Performed by: INTERNAL MEDICINE

## 2021-12-11 PROCEDURE — 85730 THROMBOPLASTIN TIME PARTIAL: CPT | Performed by: INTERNAL MEDICINE

## 2021-12-11 PROCEDURE — 94760 N-INVAS EAR/PLS OXIMETRY 1: CPT

## 2021-12-11 PROCEDURE — 94640 AIRWAY INHALATION TREATMENT: CPT

## 2021-12-11 PROCEDURE — 99233 SBSQ HOSP IP/OBS HIGH 50: CPT | Performed by: INTERNAL MEDICINE

## 2021-12-11 PROCEDURE — 80053 COMPREHEN METABOLIC PANEL: CPT | Performed by: INTERNAL MEDICINE

## 2021-12-11 RX ADMIN — HEPARIN SODIUM 15 UNITS/KG/HR: 10000 INJECTION, SOLUTION INTRAVENOUS at 09:12

## 2021-12-11 RX ADMIN — REMDESIVIR 100 MG: 100 INJECTION, POWDER, LYOPHILIZED, FOR SOLUTION INTRAVENOUS at 18:13

## 2021-12-11 RX ADMIN — FAMOTIDINE 20 MG: 20 TABLET ORAL at 09:13

## 2021-12-11 RX ADMIN — DOXYCYCLINE 100 MG: 100 CAPSULE ORAL at 20:45

## 2021-12-11 RX ADMIN — DOXYCYCLINE 100 MG: 100 CAPSULE ORAL at 09:13

## 2021-12-11 RX ADMIN — BUDESONIDE AND FORMOTEROL FUMARATE DIHYDRATE 2 PUFF: 160; 4.5 AEROSOL RESPIRATORY (INHALATION) at 20:05

## 2021-12-11 RX ADMIN — DEXAMETHASONE SODIUM PHOSPHATE 6 MG: 4 INJECTION, SOLUTION INTRA-ARTICULAR; INTRALESIONAL; INTRAMUSCULAR; INTRAVENOUS; SOFT TISSUE at 09:14

## 2021-12-11 RX ADMIN — BUDESONIDE AND FORMOTEROL FUMARATE DIHYDRATE 2 PUFF: 160; 4.5 AEROSOL RESPIRATORY (INHALATION) at 08:20

## 2021-12-11 RX ADMIN — BARICITINIB 2 MG: 2 TABLET, FILM COATED ORAL at 13:22

## 2021-12-11 RX ADMIN — CEFTRIAXONE 1000 MG: 1 INJECTION, SOLUTION INTRAVENOUS at 20:45

## 2021-12-12 ENCOUNTER — HOSPITAL ENCOUNTER (INPATIENT)
Facility: HOSPITAL | Age: 56
LOS: 8 days | Discharge: HOME | DRG: 177 | End: 2021-12-20
Attending: INTERNAL MEDICINE | Admitting: INTERNAL MEDICINE
Payer: COMMERCIAL

## 2021-12-12 ENCOUNTER — APPOINTMENT (INPATIENT)
Dept: CT IMAGING | Facility: HOSPITAL | Age: 56
DRG: 177 | End: 2021-12-12
Payer: COMMERCIAL

## 2021-12-12 VITALS
RESPIRATION RATE: 20 BRPM | TEMPERATURE: 97.7 F | OXYGEN SATURATION: 92 % | HEART RATE: 88 BPM | DIASTOLIC BLOOD PRESSURE: 67 MMHG | WEIGHT: 201.72 LBS | SYSTOLIC BLOOD PRESSURE: 122 MMHG | BODY MASS INDEX: 27.32 KG/M2 | HEIGHT: 72 IN

## 2021-12-12 DIAGNOSIS — R79.89 ELEVATED LFTS: ICD-10-CM

## 2021-12-12 DIAGNOSIS — U07.1 COVID: Primary | ICD-10-CM

## 2021-12-12 LAB
ANION GAP SERPL CALCULATED.3IONS-SCNC: 11 MMOL/L (ref 4–13)
ANISOCYTOSIS BLD QL SMEAR: PRESENT
APTT PPP: 26 SECONDS (ref 23–37)
APTT PPP: 78 SECONDS (ref 23–37)
BASOPHILS # BLD MANUAL: 0 THOUSAND/UL (ref 0–0.1)
BASOPHILS NFR MAR MANUAL: 0 % (ref 0–1)
BUN SERPL-MCNC: 41 MG/DL (ref 6–20)
CALCIUM SERPL-MCNC: 8 MG/DL (ref 8.4–10.2)
CHLORIDE SERPL-SCNC: 110 MMOL/L (ref 96–108)
CO2 SERPL-SCNC: 18 MMOL/L (ref 22–33)
CREAT SERPL-MCNC: 1.25 MG/DL (ref 0.5–1.2)
D DIMER PPP FEU-MCNC: 1.21 UG/ML FEU
EOSINOPHIL # BLD MANUAL: 0 THOUSAND/UL (ref 0–0.4)
EOSINOPHIL NFR BLD MANUAL: 0 % (ref 0–6)
ERYTHROCYTE [DISTWIDTH] IN BLOOD BY AUTOMATED COUNT: 13.3 % (ref 11.6–15.1)
GFR SERPL CREATININE-BSD FRML MDRD: 63 ML/MIN/1.73SQ M
GLUCOSE SERPL-MCNC: 117 MG/DL (ref 65–140)
HCT VFR BLD AUTO: 41.7 % (ref 36.5–49.3)
HCV AB SER QL: NORMAL
HGB BLD-MCNC: 14.3 G/DL (ref 12–17)
LYMPHOCYTES # BLD AUTO: 0.62 THOUSAND/UL (ref 0.6–4.47)
LYMPHOCYTES # BLD AUTO: 6 % (ref 14–44)
MCH RBC QN AUTO: 32.4 PG (ref 26.8–34.3)
MCHC RBC AUTO-ENTMCNC: 34.3 G/DL (ref 31.4–37.4)
MCV RBC AUTO: 94 FL (ref 82–98)
MICROCYTES BLD QL AUTO: PRESENT
MONOCYTES # BLD AUTO: 0.41 THOUSAND/UL (ref 0–1.22)
MONOCYTES NFR BLD: 4 % (ref 4–12)
NEUTROPHILS # BLD MANUAL: 9.27 THOUSAND/UL (ref 1.85–7.62)
NEUTS BAND NFR BLD MANUAL: 1 % (ref 0–8)
NEUTS SEG NFR BLD AUTO: 89 % (ref 43–75)
PLATELET # BLD AUTO: 360 THOUSANDS/UL (ref 149–390)
PLATELET BLD QL SMEAR: ADEQUATE
PMV BLD AUTO: 10.2 FL (ref 8.9–12.7)
POIKILOCYTOSIS BLD QL SMEAR: PRESENT
POTASSIUM SERPL-SCNC: 4.3 MMOL/L (ref 3.5–5)
PROCALCITONIN SERPL-MCNC: 1.14 NG/ML
RBC # BLD AUTO: 4.42 MILLION/UL (ref 3.88–5.62)
RBC MORPH BLD: PRESENT
SODIUM SERPL-SCNC: 139 MMOL/L (ref 133–145)
WBC # BLD AUTO: 10.3 THOUSAND/UL (ref 4.31–10.16)

## 2021-12-12 PROCEDURE — XW0DXM6 INTRODUCTION OF BARICITINIB INTO MOUTH AND PHARYNX, EXTERNAL APPROACH, NEW TECHNOLOGY GROUP 6: ICD-10-PCS | Performed by: INTERNAL MEDICINE

## 2021-12-12 PROCEDURE — 94640 AIRWAY INHALATION TREATMENT: CPT

## 2021-12-12 PROCEDURE — XW033E5 INTRODUCTION OF REMDESIVIR ANTI-INFECTIVE INTO PERIPHERAL VEIN, PERCUTANEOUS APPROACH, NEW TECHNOLOGY GROUP 5: ICD-10-PCS | Performed by: INTERNAL MEDICINE

## 2021-12-12 PROCEDURE — 87081 CULTURE SCREEN ONLY: CPT | Performed by: INTERNAL MEDICINE

## 2021-12-12 PROCEDURE — 94760 N-INVAS EAR/PLS OXIMETRY 1: CPT

## 2021-12-12 PROCEDURE — 85379 FIBRIN DEGRADATION QUANT: CPT | Performed by: INTERNAL MEDICINE

## 2021-12-12 PROCEDURE — 85007 BL SMEAR W/DIFF WBC COUNT: CPT | Performed by: INTERNAL MEDICINE

## 2021-12-12 PROCEDURE — 85730 THROMBOPLASTIN TIME PARTIAL: CPT | Performed by: INTERNAL MEDICINE

## 2021-12-12 PROCEDURE — 99223 1ST HOSP IP/OBS HIGH 75: CPT | Performed by: INTERNAL MEDICINE

## 2021-12-12 PROCEDURE — 85027 COMPLETE CBC AUTOMATED: CPT | Performed by: INTERNAL MEDICINE

## 2021-12-12 PROCEDURE — 99239 HOSP IP/OBS DSCHRG MGMT >30: CPT | Performed by: INTERNAL MEDICINE

## 2021-12-12 PROCEDURE — G1004 CDSM NDSC: HCPCS

## 2021-12-12 PROCEDURE — 90471 IMMUNIZATION ADMIN: CPT | Performed by: INTERNAL MEDICINE

## 2021-12-12 PROCEDURE — 71275 CT ANGIOGRAPHY CHEST: CPT

## 2021-12-12 PROCEDURE — 80048 BASIC METABOLIC PNL TOTAL CA: CPT | Performed by: INTERNAL MEDICINE

## 2021-12-12 PROCEDURE — 90682 RIV4 VACC RECOMBINANT DNA IM: CPT | Performed by: INTERNAL MEDICINE

## 2021-12-12 RX ORDER — CEFTRIAXONE 1 G/50ML
1000 INJECTION, SOLUTION INTRAVENOUS EVERY 24 HOURS
Status: DISCONTINUED | OUTPATIENT
Start: 2021-12-12 | End: 2021-12-16

## 2021-12-12 RX ORDER — SODIUM CHLORIDE 9 MG/ML
75 INJECTION, SOLUTION INTRAVENOUS CONTINUOUS
Status: DISCONTINUED | OUTPATIENT
Start: 2021-12-12 | End: 2021-12-13

## 2021-12-12 RX ORDER — DEXAMETHASONE SODIUM PHOSPHATE 4 MG/ML
0.1 INJECTION, SOLUTION INTRA-ARTICULAR; INTRALESIONAL; INTRAMUSCULAR; INTRAVENOUS; SOFT TISSUE EVERY 12 HOURS SCHEDULED
Status: DISCONTINUED | OUTPATIENT
Start: 2021-12-12 | End: 2021-12-12 | Stop reason: HOSPADM

## 2021-12-12 RX ORDER — BUDESONIDE AND FORMOTEROL FUMARATE DIHYDRATE 80; 4.5 UG/1; UG/1
2 AEROSOL RESPIRATORY (INHALATION) 2 TIMES DAILY
Status: DISCONTINUED | OUTPATIENT
Start: 2021-12-12 | End: 2021-12-20 | Stop reason: HOSPADM

## 2021-12-12 RX ORDER — SODIUM CHLORIDE 9 MG/ML
75 INJECTION, SOLUTION INTRAVENOUS CONTINUOUS
Status: CANCELLED | OUTPATIENT
Start: 2021-12-12

## 2021-12-12 RX ORDER — BUDESONIDE AND FORMOTEROL FUMARATE DIHYDRATE 160; 4.5 UG/1; UG/1
2 AEROSOL RESPIRATORY (INHALATION)
Status: DISCONTINUED | OUTPATIENT
Start: 2021-12-12 | End: 2021-12-12

## 2021-12-12 RX ORDER — FOLIC ACID 1 MG/1
1 TABLET ORAL DAILY
Status: DISCONTINUED | OUTPATIENT
Start: 2021-12-13 | End: 2021-12-20 | Stop reason: HOSPADM

## 2021-12-12 RX ORDER — DOXYCYCLINE HYCLATE 100 MG/1
100 CAPSULE ORAL EVERY 12 HOURS SCHEDULED
Status: DISCONTINUED | OUTPATIENT
Start: 2021-12-12 | End: 2021-12-17

## 2021-12-12 RX ORDER — DEXAMETHASONE SODIUM PHOSPHATE 4 MG/ML
0.1 INJECTION, SOLUTION INTRA-ARTICULAR; INTRALESIONAL; INTRAMUSCULAR; INTRAVENOUS; SOFT TISSUE EVERY 12 HOURS SCHEDULED
Status: DISCONTINUED | OUTPATIENT
Start: 2021-12-12 | End: 2021-12-12 | Stop reason: SDUPTHER

## 2021-12-12 RX ORDER — LANOLIN ALCOHOL/MO/W.PET/CERES
100 CREAM (GRAM) TOPICAL DAILY
Status: CANCELLED | OUTPATIENT
Start: 2021-12-13

## 2021-12-12 RX ORDER — LANOLIN ALCOHOL/MO/W.PET/CERES
100 CREAM (GRAM) TOPICAL DAILY
Status: DISCONTINUED | OUTPATIENT
Start: 2021-12-12 | End: 2021-12-12 | Stop reason: HOSPADM

## 2021-12-12 RX ORDER — DEXAMETHASONE SODIUM PHOSPHATE 4 MG/ML
10 INJECTION, SOLUTION INTRA-ARTICULAR; INTRALESIONAL; INTRAMUSCULAR; INTRAVENOUS; SOFT TISSUE EVERY 12 HOURS SCHEDULED
Status: DISCONTINUED | OUTPATIENT
Start: 2021-12-12 | End: 2021-12-20 | Stop reason: HOSPADM

## 2021-12-12 RX ORDER — FOLIC ACID 1 MG/1
1 TABLET ORAL DAILY
Status: DISCONTINUED | OUTPATIENT
Start: 2021-12-12 | End: 2021-12-12 | Stop reason: HOSPADM

## 2021-12-12 RX ORDER — LANOLIN ALCOHOL/MO/W.PET/CERES
100 CREAM (GRAM) TOPICAL DAILY
Status: DISCONTINUED | OUTPATIENT
Start: 2021-12-13 | End: 2021-12-20 | Stop reason: HOSPADM

## 2021-12-12 RX ORDER — FOLIC ACID 1 MG/1
1 TABLET ORAL DAILY
Status: CANCELLED | OUTPATIENT
Start: 2021-12-13

## 2021-12-12 RX ORDER — DOXYCYCLINE HYCLATE 100 MG/1
100 CAPSULE ORAL EVERY 12 HOURS SCHEDULED
Status: CANCELLED | OUTPATIENT
Start: 2021-12-12

## 2021-12-12 RX ORDER — FAMOTIDINE 20 MG/1
20 TABLET, FILM COATED ORAL DAILY
Status: DISCONTINUED | OUTPATIENT
Start: 2021-12-13 | End: 2021-12-20 | Stop reason: HOSPADM

## 2021-12-12 RX ORDER — FAMOTIDINE 20 MG/1
20 TABLET, FILM COATED ORAL DAILY
Status: CANCELLED | OUTPATIENT
Start: 2021-12-12

## 2021-12-12 RX ORDER — CEFTRIAXONE 1 G/50ML
1000 INJECTION, SOLUTION INTRAVENOUS EVERY 24 HOURS
Status: CANCELLED | OUTPATIENT
Start: 2021-12-12

## 2021-12-12 RX ORDER — ALBUTEROL SULFATE 90 UG/1
2 AEROSOL, METERED RESPIRATORY (INHALATION) 4 TIMES DAILY
Status: DISCONTINUED | OUTPATIENT
Start: 2021-12-12 | End: 2021-12-20 | Stop reason: HOSPADM

## 2021-12-12 RX ORDER — BUDESONIDE AND FORMOTEROL FUMARATE DIHYDRATE 160; 4.5 UG/1; UG/1
2 AEROSOL RESPIRATORY (INHALATION)
Status: CANCELLED | OUTPATIENT
Start: 2021-12-12

## 2021-12-12 RX ORDER — DEXAMETHASONE SODIUM PHOSPHATE 4 MG/ML
0.1 INJECTION, SOLUTION INTRA-ARTICULAR; INTRALESIONAL; INTRAMUSCULAR; INTRAVENOUS; SOFT TISSUE EVERY 12 HOURS SCHEDULED
Status: CANCELLED | OUTPATIENT
Start: 2021-12-12

## 2021-12-12 RX ADMIN — REMDESIVIR 100 MG: 100 INJECTION, POWDER, LYOPHILIZED, FOR SOLUTION INTRAVENOUS at 17:54

## 2021-12-12 RX ADMIN — DOXYCYCLINE 100 MG: 100 CAPSULE ORAL at 08:10

## 2021-12-12 RX ADMIN — BUDESONIDE AND FORMOTEROL FUMARATE DIHYDRATE 2 PUFF: 160; 4.5 AEROSOL RESPIRATORY (INHALATION) at 09:15

## 2021-12-12 RX ADMIN — CEFTRIAXONE 1000 MG: 1 INJECTION, SOLUTION INTRAVENOUS at 16:42

## 2021-12-12 RX ADMIN — ENOXAPARIN SODIUM 30 MG: 30 INJECTION SUBCUTANEOUS at 17:55

## 2021-12-12 RX ADMIN — FAMOTIDINE 20 MG: 20 TABLET ORAL at 08:10

## 2021-12-12 RX ADMIN — DOXYCYCLINE 100 MG: 100 CAPSULE ORAL at 20:45

## 2021-12-12 RX ADMIN — BUDESONIDE AND FORMOTEROL FUMARATE DIHYDRATE 2 PUFF: 80; 4.5 AEROSOL RESPIRATORY (INHALATION) at 18:28

## 2021-12-12 RX ADMIN — BARICITINIB 2 MG: 2 TABLET, FILM COATED ORAL at 11:30

## 2021-12-12 RX ADMIN — SODIUM CHLORIDE 75 ML/HR: 0.9 INJECTION, SOLUTION INTRAVENOUS at 13:43

## 2021-12-12 RX ADMIN — INFLUENZA A VIRUS A/WISCONSIN/588/2019 (H1N1) RECOMBINANT HEMAGGLUTININ ANTIGEN, INFLUENZA A VIRUS A/TASMANIA/503/2020 (H3N2) RECOMBINANT HEMAGGLUTININ ANTIGEN, INFLUENZA B VIRUS B/WASHINGTON/02/2019 RECOMBINANT HEMAGGLUTININ ANTIGEN, AND INFLUENZA B VIRUS B/PHUKET/3073/2013 RECOMBINANT HEMAGGLUTININ ANTIGEN 0.5 ML: 45; 45; 45; 45 INJECTION INTRAMUSCULAR at 16:44

## 2021-12-12 RX ADMIN — ALBUTEROL SULFATE 2 PUFF: 90 AEROSOL, METERED RESPIRATORY (INHALATION) at 18:29

## 2021-12-12 RX ADMIN — DEXAMETHASONE SODIUM PHOSPHATE 6 MG: 4 INJECTION, SOLUTION INTRA-ARTICULAR; INTRALESIONAL; INTRAMUSCULAR; INTRAVENOUS; SOFT TISSUE at 08:10

## 2021-12-12 RX ADMIN — DEXAMETHASONE SODIUM PHOSPHATE 10 MG: 4 INJECTION, SOLUTION INTRA-ARTICULAR; INTRALESIONAL; INTRAMUSCULAR; INTRAVENOUS; SOFT TISSUE at 20:44

## 2021-12-12 RX ADMIN — FOLIC ACID 1 MG: 1 TABLET ORAL at 12:34

## 2021-12-12 RX ADMIN — THIAMINE HCL TAB 100 MG 100 MG: 100 TAB at 12:34

## 2021-12-12 RX ADMIN — IOHEXOL 85 ML: 350 INJECTION, SOLUTION INTRAVENOUS at 11:05

## 2021-12-12 RX ADMIN — ALBUTEROL SULFATE 2 PUFF: 90 AEROSOL, METERED RESPIRATORY (INHALATION) at 21:33

## 2021-12-13 PROBLEM — E87.5 HYPERKALEMIA: Status: ACTIVE | Noted: 2021-12-13

## 2021-12-13 PROBLEM — E87.29 HIGH ANION GAP METABOLIC ACIDOSIS: Status: RESOLVED | Noted: 2021-12-10 | Resolved: 2021-12-13

## 2021-12-13 PROBLEM — E87.2 HIGH ANION GAP METABOLIC ACIDOSIS: Status: RESOLVED | Noted: 2021-12-10 | Resolved: 2021-12-13

## 2021-12-13 PROBLEM — J96.01 ACUTE RESPIRATORY FAILURE WITH HYPOXIA (HCC): Status: ACTIVE | Noted: 2021-12-13

## 2021-12-13 LAB
ALBUMIN SERPL BCP-MCNC: 2.6 G/DL (ref 3.5–5)
ALP SERPL-CCNC: 72 U/L (ref 46–116)
ALT SERPL W P-5'-P-CCNC: 151 U/L (ref 12–78)
ANION GAP SERPL CALCULATED.3IONS-SCNC: 9 MMOL/L (ref 4–13)
AST SERPL W P-5'-P-CCNC: 80 U/L (ref 5–45)
BILIRUB DIRECT SERPL-MCNC: 0.24 MG/DL (ref 0–0.2)
BILIRUB SERPL-MCNC: 0.67 MG/DL (ref 0.2–1)
BUN SERPL-MCNC: 28 MG/DL (ref 5–25)
CALCIUM SERPL-MCNC: 8.3 MG/DL (ref 8.3–10.1)
CHLORIDE SERPL-SCNC: 108 MMOL/L (ref 100–108)
CO2 SERPL-SCNC: 25 MMOL/L (ref 21–32)
CREAT SERPL-MCNC: 1.18 MG/DL (ref 0.6–1.3)
ERYTHROCYTE [DISTWIDTH] IN BLOOD BY AUTOMATED COUNT: 13.1 % (ref 11.6–15.1)
GFR SERPL CREATININE-BSD FRML MDRD: 68 ML/MIN/1.73SQ M
GLUCOSE SERPL-MCNC: 109 MG/DL (ref 65–140)
HCT VFR BLD AUTO: 43.6 % (ref 36.5–49.3)
HGB BLD-MCNC: 14.8 G/DL (ref 12–17)
MCH RBC QN AUTO: 33.4 PG (ref 26.8–34.3)
MCHC RBC AUTO-ENTMCNC: 33.9 G/DL (ref 31.4–37.4)
MCV RBC AUTO: 98 FL (ref 82–98)
PLATELET # BLD AUTO: 391 THOUSANDS/UL (ref 149–390)
PMV BLD AUTO: 9.8 FL (ref 8.9–12.7)
POTASSIUM SERPL-SCNC: 5.4 MMOL/L (ref 3.5–5.3)
PROT SERPL-MCNC: 7.1 G/DL (ref 6.4–8.2)
RBC # BLD AUTO: 4.43 MILLION/UL (ref 3.88–5.62)
SODIUM SERPL-SCNC: 142 MMOL/L (ref 136–145)
WBC # BLD AUTO: 12.05 THOUSAND/UL (ref 4.31–10.16)

## 2021-12-13 PROCEDURE — 80076 HEPATIC FUNCTION PANEL: CPT | Performed by: INTERNAL MEDICINE

## 2021-12-13 PROCEDURE — 80048 BASIC METABOLIC PNL TOTAL CA: CPT | Performed by: INTERNAL MEDICINE

## 2021-12-13 PROCEDURE — 94760 N-INVAS EAR/PLS OXIMETRY 1: CPT

## 2021-12-13 PROCEDURE — 99232 SBSQ HOSP IP/OBS MODERATE 35: CPT | Performed by: INTERNAL MEDICINE

## 2021-12-13 PROCEDURE — 85027 COMPLETE CBC AUTOMATED: CPT | Performed by: INTERNAL MEDICINE

## 2021-12-13 RX ORDER — SODIUM POLYSTYRENE SULFONATE 4.1 MEQ/G
15 POWDER, FOR SUSPENSION ORAL; RECTAL ONCE
Status: COMPLETED | OUTPATIENT
Start: 2021-12-13 | End: 2021-12-13

## 2021-12-13 RX ADMIN — SODIUM POLYSTYRENE SULFONATE 15 G: 1 POWDER ORAL; RECTAL at 09:06

## 2021-12-13 RX ADMIN — ENOXAPARIN SODIUM 30 MG: 30 INJECTION SUBCUTANEOUS at 09:01

## 2021-12-13 RX ADMIN — ALBUTEROL SULFATE 2 PUFF: 90 AEROSOL, METERED RESPIRATORY (INHALATION) at 11:54

## 2021-12-13 RX ADMIN — FAMOTIDINE 20 MG: 20 TABLET ORAL at 09:00

## 2021-12-13 RX ADMIN — ALBUTEROL SULFATE 2 PUFF: 90 AEROSOL, METERED RESPIRATORY (INHALATION) at 09:00

## 2021-12-13 RX ADMIN — DEXAMETHASONE SODIUM PHOSPHATE 10 MG: 4 INJECTION, SOLUTION INTRA-ARTICULAR; INTRALESIONAL; INTRAMUSCULAR; INTRAVENOUS; SOFT TISSUE at 20:14

## 2021-12-13 RX ADMIN — DOXYCYCLINE 100 MG: 100 CAPSULE ORAL at 09:00

## 2021-12-13 RX ADMIN — SODIUM CHLORIDE 75 ML/HR: 0.9 INJECTION, SOLUTION INTRAVENOUS at 02:49

## 2021-12-13 RX ADMIN — ENOXAPARIN SODIUM 30 MG: 30 INJECTION SUBCUTANEOUS at 20:14

## 2021-12-13 RX ADMIN — FOLIC ACID 1 MG: 1 TABLET ORAL at 09:00

## 2021-12-13 RX ADMIN — THIAMINE HCL TAB 100 MG 100 MG: 100 TAB at 09:00

## 2021-12-13 RX ADMIN — DOXYCYCLINE 100 MG: 100 CAPSULE ORAL at 20:14

## 2021-12-13 RX ADMIN — BUDESONIDE AND FORMOTEROL FUMARATE DIHYDRATE 2 PUFF: 80; 4.5 AEROSOL RESPIRATORY (INHALATION) at 17:06

## 2021-12-13 RX ADMIN — ALBUTEROL SULFATE 2 PUFF: 90 AEROSOL, METERED RESPIRATORY (INHALATION) at 23:30

## 2021-12-13 RX ADMIN — REMDESIVIR 100 MG: 100 INJECTION, POWDER, LYOPHILIZED, FOR SOLUTION INTRAVENOUS at 15:31

## 2021-12-13 RX ADMIN — BUDESONIDE AND FORMOTEROL FUMARATE DIHYDRATE 2 PUFF: 80; 4.5 AEROSOL RESPIRATORY (INHALATION) at 09:01

## 2021-12-13 RX ADMIN — BARICITINIB 4 MG: 2 TABLET, FILM COATED ORAL at 10:07

## 2021-12-13 RX ADMIN — CEFTRIAXONE 1000 MG: 1 INJECTION, SOLUTION INTRAVENOUS at 15:01

## 2021-12-13 RX ADMIN — DEXAMETHASONE SODIUM PHOSPHATE 10 MG: 4 INJECTION, SOLUTION INTRA-ARTICULAR; INTRALESIONAL; INTRAMUSCULAR; INTRAVENOUS; SOFT TISSUE at 09:01

## 2021-12-13 RX ADMIN — ALBUTEROL SULFATE 2 PUFF: 90 AEROSOL, METERED RESPIRATORY (INHALATION) at 17:06

## 2021-12-14 LAB
ALBUMIN SERPL BCP-MCNC: 2.5 G/DL (ref 3.5–5)
ALP SERPL-CCNC: 67 U/L (ref 46–116)
ALT SERPL W P-5'-P-CCNC: 127 U/L (ref 12–78)
ANION GAP SERPL CALCULATED.3IONS-SCNC: 11 MMOL/L (ref 4–13)
AST SERPL W P-5'-P-CCNC: 63 U/L (ref 5–45)
ATRIAL RATE: 109 BPM
BASOPHILS # BLD AUTO: 0.03 THOUSANDS/ΜL (ref 0–0.1)
BASOPHILS NFR BLD AUTO: 0 % (ref 0–1)
BILIRUB SERPL-MCNC: 0.67 MG/DL (ref 0.2–1)
BUN SERPL-MCNC: 25 MG/DL (ref 5–25)
CALCIUM ALBUM COR SERPL-MCNC: 9.8 MG/DL (ref 8.3–10.1)
CALCIUM SERPL-MCNC: 8.6 MG/DL (ref 8.3–10.1)
CHLORIDE SERPL-SCNC: 101 MMOL/L (ref 100–108)
CO2 SERPL-SCNC: 23 MMOL/L (ref 21–32)
CREAT SERPL-MCNC: 1 MG/DL (ref 0.6–1.3)
CRP SERPL QL: 24.7 MG/L
EOSINOPHIL # BLD AUTO: 0 THOUSAND/ΜL (ref 0–0.61)
EOSINOPHIL NFR BLD AUTO: 0 % (ref 0–6)
ERYTHROCYTE [DISTWIDTH] IN BLOOD BY AUTOMATED COUNT: 12.7 % (ref 11.6–15.1)
GFR SERPL CREATININE-BSD FRML MDRD: 83 ML/MIN/1.73SQ M
GLUCOSE SERPL-MCNC: 121 MG/DL (ref 65–140)
HCT VFR BLD AUTO: 42.8 % (ref 36.5–49.3)
HGB BLD-MCNC: 14.6 G/DL (ref 12–17)
IMM GRANULOCYTES # BLD AUTO: 0.18 THOUSAND/UL (ref 0–0.2)
IMM GRANULOCYTES NFR BLD AUTO: 2 % (ref 0–2)
LYMPHOCYTES # BLD AUTO: 0.84 THOUSANDS/ΜL (ref 0.6–4.47)
LYMPHOCYTES NFR BLD AUTO: 8 % (ref 14–44)
MCH RBC QN AUTO: 33 PG (ref 26.8–34.3)
MCHC RBC AUTO-ENTMCNC: 34.1 G/DL (ref 31.4–37.4)
MCV RBC AUTO: 97 FL (ref 82–98)
MONOCYTES # BLD AUTO: 0.73 THOUSAND/ΜL (ref 0.17–1.22)
MONOCYTES NFR BLD AUTO: 7 % (ref 4–12)
MRSA NOSE QL CULT: NORMAL
NEUTROPHILS # BLD AUTO: 8.96 THOUSANDS/ΜL (ref 1.85–7.62)
NEUTS SEG NFR BLD AUTO: 83 % (ref 43–75)
NRBC BLD AUTO-RTO: 0 /100 WBCS
P AXIS: 38 DEGREES
PLATELET # BLD AUTO: 369 THOUSANDS/UL (ref 149–390)
PMV BLD AUTO: 9.7 FL (ref 8.9–12.7)
POTASSIUM SERPL-SCNC: 4.3 MMOL/L (ref 3.5–5.3)
PR INTERVAL: 140 MS
PROCALCITONIN SERPL-MCNC: 0.07 NG/ML
PROT SERPL-MCNC: 7 G/DL (ref 6.4–8.2)
QRS AXIS: 270 DEGREES
QRSD INTERVAL: 102 MS
QT INTERVAL: 352 MS
QTC INTERVAL: 475 MS
RBC # BLD AUTO: 4.42 MILLION/UL (ref 3.88–5.62)
SODIUM SERPL-SCNC: 135 MMOL/L (ref 136–145)
T WAVE AXIS: 17 DEGREES
VENTRICULAR RATE: 109 BPM
WBC # BLD AUTO: 10.74 THOUSAND/UL (ref 4.31–10.16)

## 2021-12-14 PROCEDURE — 87070 CULTURE OTHR SPECIMN AEROBIC: CPT | Performed by: INTERNAL MEDICINE

## 2021-12-14 PROCEDURE — 93010 ELECTROCARDIOGRAM REPORT: CPT | Performed by: INTERNAL MEDICINE

## 2021-12-14 PROCEDURE — 94760 N-INVAS EAR/PLS OXIMETRY 1: CPT

## 2021-12-14 PROCEDURE — 80053 COMPREHEN METABOLIC PANEL: CPT | Performed by: INTERNAL MEDICINE

## 2021-12-14 PROCEDURE — 87205 SMEAR GRAM STAIN: CPT | Performed by: INTERNAL MEDICINE

## 2021-12-14 PROCEDURE — 99232 SBSQ HOSP IP/OBS MODERATE 35: CPT | Performed by: FAMILY MEDICINE

## 2021-12-14 PROCEDURE — 86140 C-REACTIVE PROTEIN: CPT | Performed by: INTERNAL MEDICINE

## 2021-12-14 PROCEDURE — 84145 PROCALCITONIN (PCT): CPT | Performed by: INTERNAL MEDICINE

## 2021-12-14 PROCEDURE — 85025 COMPLETE CBC W/AUTO DIFF WBC: CPT | Performed by: INTERNAL MEDICINE

## 2021-12-14 RX ADMIN — BUDESONIDE AND FORMOTEROL FUMARATE DIHYDRATE 2 PUFF: 80; 4.5 AEROSOL RESPIRATORY (INHALATION) at 08:59

## 2021-12-14 RX ADMIN — ENOXAPARIN SODIUM 30 MG: 30 INJECTION SUBCUTANEOUS at 21:57

## 2021-12-14 RX ADMIN — ALBUTEROL SULFATE 2 PUFF: 90 AEROSOL, METERED RESPIRATORY (INHALATION) at 08:59

## 2021-12-14 RX ADMIN — FAMOTIDINE 20 MG: 20 TABLET ORAL at 08:59

## 2021-12-14 RX ADMIN — THIAMINE HCL TAB 100 MG 100 MG: 100 TAB at 08:59

## 2021-12-14 RX ADMIN — BUDESONIDE AND FORMOTEROL FUMARATE DIHYDRATE 2 PUFF: 80; 4.5 AEROSOL RESPIRATORY (INHALATION) at 17:13

## 2021-12-14 RX ADMIN — ALBUTEROL SULFATE 2 PUFF: 90 AEROSOL, METERED RESPIRATORY (INHALATION) at 11:18

## 2021-12-14 RX ADMIN — ENOXAPARIN SODIUM 30 MG: 30 INJECTION SUBCUTANEOUS at 08:59

## 2021-12-14 RX ADMIN — CEFTRIAXONE 1000 MG: 1 INJECTION, SOLUTION INTRAVENOUS at 15:02

## 2021-12-14 RX ADMIN — ALBUTEROL SULFATE 2 PUFF: 90 AEROSOL, METERED RESPIRATORY (INHALATION) at 21:54

## 2021-12-14 RX ADMIN — FOLIC ACID 1 MG: 1 TABLET ORAL at 08:59

## 2021-12-14 RX ADMIN — DEXAMETHASONE SODIUM PHOSPHATE 10 MG: 4 INJECTION, SOLUTION INTRA-ARTICULAR; INTRALESIONAL; INTRAMUSCULAR; INTRAVENOUS; SOFT TISSUE at 21:54

## 2021-12-14 RX ADMIN — DOXYCYCLINE 100 MG: 100 CAPSULE ORAL at 08:59

## 2021-12-14 RX ADMIN — ALBUTEROL SULFATE 2 PUFF: 90 AEROSOL, METERED RESPIRATORY (INHALATION) at 17:13

## 2021-12-14 RX ADMIN — DOXYCYCLINE 100 MG: 100 CAPSULE ORAL at 21:56

## 2021-12-14 RX ADMIN — BARICITINIB 4 MG: 2 TABLET, FILM COATED ORAL at 10:39

## 2021-12-14 RX ADMIN — REMDESIVIR 100 MG: 100 INJECTION, POWDER, LYOPHILIZED, FOR SOLUTION INTRAVENOUS at 15:52

## 2021-12-14 RX ADMIN — DEXAMETHASONE SODIUM PHOSPHATE 10 MG: 4 INJECTION, SOLUTION INTRA-ARTICULAR; INTRALESIONAL; INTRAMUSCULAR; INTRAVENOUS; SOFT TISSUE at 08:59

## 2021-12-15 PROBLEM — N17.9 AKI (ACUTE KIDNEY INJURY) (HCC): Status: RESOLVED | Noted: 2021-12-10 | Resolved: 2021-12-15

## 2021-12-15 LAB
ALBUMIN SERPL BCP-MCNC: 2.7 G/DL (ref 3.5–5)
ALP SERPL-CCNC: 68 U/L (ref 46–116)
ALT SERPL W P-5'-P-CCNC: 140 U/L (ref 12–78)
ANION GAP SERPL CALCULATED.3IONS-SCNC: 11 MMOL/L (ref 4–13)
AST SERPL W P-5'-P-CCNC: 74 U/L (ref 5–45)
BACTERIA BLD CULT: NORMAL
BACTERIA BLD CULT: NORMAL
BILIRUB SERPL-MCNC: 0.72 MG/DL (ref 0.2–1)
BUN SERPL-MCNC: 25 MG/DL (ref 5–25)
CALCIUM ALBUM COR SERPL-MCNC: 9.7 MG/DL (ref 8.3–10.1)
CALCIUM SERPL-MCNC: 8.7 MG/DL (ref 8.3–10.1)
CHLORIDE SERPL-SCNC: 103 MMOL/L (ref 100–108)
CO2 SERPL-SCNC: 27 MMOL/L (ref 21–32)
CREAT SERPL-MCNC: 1.15 MG/DL (ref 0.6–1.3)
ERYTHROCYTE [DISTWIDTH] IN BLOOD BY AUTOMATED COUNT: 12.7 % (ref 11.6–15.1)
GFR SERPL CREATININE-BSD FRML MDRD: 69 ML/MIN/1.73SQ M
GLUCOSE SERPL-MCNC: 123 MG/DL (ref 65–140)
HCT VFR BLD AUTO: 44.8 % (ref 36.5–49.3)
HGB BLD-MCNC: 15.4 G/DL (ref 12–17)
MCH RBC QN AUTO: 32.8 PG (ref 26.8–34.3)
MCHC RBC AUTO-ENTMCNC: 34.4 G/DL (ref 31.4–37.4)
MCV RBC AUTO: 96 FL (ref 82–98)
PLATELET # BLD AUTO: 430 THOUSANDS/UL (ref 149–390)
PMV BLD AUTO: 9.8 FL (ref 8.9–12.7)
POTASSIUM SERPL-SCNC: 4.4 MMOL/L (ref 3.5–5.3)
PROT SERPL-MCNC: 7.3 G/DL (ref 6.4–8.2)
RBC # BLD AUTO: 4.69 MILLION/UL (ref 3.88–5.62)
SODIUM SERPL-SCNC: 141 MMOL/L (ref 136–145)
WBC # BLD AUTO: 11.92 THOUSAND/UL (ref 4.31–10.16)

## 2021-12-15 PROCEDURE — 94760 N-INVAS EAR/PLS OXIMETRY 1: CPT

## 2021-12-15 PROCEDURE — 99232 SBSQ HOSP IP/OBS MODERATE 35: CPT | Performed by: FAMILY MEDICINE

## 2021-12-15 PROCEDURE — 85027 COMPLETE CBC AUTOMATED: CPT | Performed by: FAMILY MEDICINE

## 2021-12-15 PROCEDURE — 80053 COMPREHEN METABOLIC PANEL: CPT | Performed by: FAMILY MEDICINE

## 2021-12-15 RX ADMIN — DEXAMETHASONE SODIUM PHOSPHATE 10 MG: 4 INJECTION, SOLUTION INTRA-ARTICULAR; INTRALESIONAL; INTRAMUSCULAR; INTRAVENOUS; SOFT TISSUE at 21:32

## 2021-12-15 RX ADMIN — FAMOTIDINE 20 MG: 20 TABLET ORAL at 09:37

## 2021-12-15 RX ADMIN — ALBUTEROL SULFATE 2 PUFF: 90 AEROSOL, METERED RESPIRATORY (INHALATION) at 16:38

## 2021-12-15 RX ADMIN — ENOXAPARIN SODIUM 30 MG: 30 INJECTION SUBCUTANEOUS at 09:38

## 2021-12-15 RX ADMIN — DEXAMETHASONE SODIUM PHOSPHATE 10 MG: 4 INJECTION, SOLUTION INTRA-ARTICULAR; INTRALESIONAL; INTRAMUSCULAR; INTRAVENOUS; SOFT TISSUE at 09:37

## 2021-12-15 RX ADMIN — FOLIC ACID 1 MG: 1 TABLET ORAL at 09:37

## 2021-12-15 RX ADMIN — BARICITINIB 4 MG: 2 TABLET, FILM COATED ORAL at 12:05

## 2021-12-15 RX ADMIN — DOXYCYCLINE 100 MG: 100 CAPSULE ORAL at 09:37

## 2021-12-15 RX ADMIN — ENOXAPARIN SODIUM 30 MG: 30 INJECTION SUBCUTANEOUS at 21:32

## 2021-12-15 RX ADMIN — DOXYCYCLINE 100 MG: 100 CAPSULE ORAL at 21:32

## 2021-12-15 RX ADMIN — CEFTRIAXONE 1000 MG: 1 INJECTION, SOLUTION INTRAVENOUS at 16:37

## 2021-12-15 RX ADMIN — THIAMINE HCL TAB 100 MG 100 MG: 100 TAB at 09:37

## 2021-12-15 RX ADMIN — BUDESONIDE AND FORMOTEROL FUMARATE DIHYDRATE 2 PUFF: 80; 4.5 AEROSOL RESPIRATORY (INHALATION) at 17:28

## 2021-12-15 RX ADMIN — ALBUTEROL SULFATE 2 PUFF: 90 AEROSOL, METERED RESPIRATORY (INHALATION) at 12:04

## 2021-12-15 RX ADMIN — BUDESONIDE AND FORMOTEROL FUMARATE DIHYDRATE 2 PUFF: 80; 4.5 AEROSOL RESPIRATORY (INHALATION) at 09:38

## 2021-12-15 RX ADMIN — ALBUTEROL SULFATE 2 PUFF: 90 AEROSOL, METERED RESPIRATORY (INHALATION) at 09:37

## 2021-12-15 RX ADMIN — ALBUTEROL SULFATE 2 PUFF: 90 AEROSOL, METERED RESPIRATORY (INHALATION) at 21:32

## 2021-12-16 PROBLEM — E87.5 HYPERKALEMIA: Status: RESOLVED | Noted: 2021-12-13 | Resolved: 2021-12-16

## 2021-12-16 LAB
ALBUMIN SERPL BCP-MCNC: 2.7 G/DL (ref 3.5–5)
ALP SERPL-CCNC: 68 U/L (ref 46–116)
ALT SERPL W P-5'-P-CCNC: 171 U/L (ref 12–78)
ANION GAP SERPL CALCULATED.3IONS-SCNC: 12 MMOL/L (ref 4–13)
AST SERPL W P-5'-P-CCNC: 90 U/L (ref 5–45)
BACTERIA SPT RESP CULT: ABNORMAL
BILIRUB SERPL-MCNC: 0.64 MG/DL (ref 0.2–1)
BUN SERPL-MCNC: 31 MG/DL (ref 5–25)
CALCIUM ALBUM COR SERPL-MCNC: 10.3 MG/DL (ref 8.3–10.1)
CALCIUM SERPL-MCNC: 9.3 MG/DL (ref 8.3–10.1)
CHLORIDE SERPL-SCNC: 102 MMOL/L (ref 100–108)
CO2 SERPL-SCNC: 25 MMOL/L (ref 21–32)
CREAT SERPL-MCNC: 1.17 MG/DL (ref 0.6–1.3)
CRP SERPL QL: 14.6 MG/L
ERYTHROCYTE [DISTWIDTH] IN BLOOD BY AUTOMATED COUNT: 12.7 % (ref 11.6–15.1)
GFR SERPL CREATININE-BSD FRML MDRD: 68 ML/MIN/1.73SQ M
GLUCOSE SERPL-MCNC: 127 MG/DL (ref 65–140)
GRAM STN SPEC: ABNORMAL
HCT VFR BLD AUTO: 46 % (ref 36.5–49.3)
HGB BLD-MCNC: 15.5 G/DL (ref 12–17)
MCH RBC QN AUTO: 31.9 PG (ref 26.8–34.3)
MCHC RBC AUTO-ENTMCNC: 33.7 G/DL (ref 31.4–37.4)
MCV RBC AUTO: 95 FL (ref 82–98)
PLATELET # BLD AUTO: 433 THOUSANDS/UL (ref 149–390)
PMV BLD AUTO: 10 FL (ref 8.9–12.7)
POTASSIUM SERPL-SCNC: 4.8 MMOL/L (ref 3.5–5.3)
PROT SERPL-MCNC: 7.5 G/DL (ref 6.4–8.2)
RBC # BLD AUTO: 4.86 MILLION/UL (ref 3.88–5.62)
SODIUM SERPL-SCNC: 139 MMOL/L (ref 136–145)
WBC # BLD AUTO: 14.25 THOUSAND/UL (ref 4.31–10.16)

## 2021-12-16 PROCEDURE — 99232 SBSQ HOSP IP/OBS MODERATE 35: CPT | Performed by: FAMILY MEDICINE

## 2021-12-16 PROCEDURE — 85027 COMPLETE CBC AUTOMATED: CPT | Performed by: FAMILY MEDICINE

## 2021-12-16 PROCEDURE — 94760 N-INVAS EAR/PLS OXIMETRY 1: CPT

## 2021-12-16 PROCEDURE — 86140 C-REACTIVE PROTEIN: CPT | Performed by: FAMILY MEDICINE

## 2021-12-16 PROCEDURE — 80053 COMPREHEN METABOLIC PANEL: CPT | Performed by: FAMILY MEDICINE

## 2021-12-16 RX ADMIN — ALBUTEROL SULFATE 2 PUFF: 90 AEROSOL, METERED RESPIRATORY (INHALATION) at 08:53

## 2021-12-16 RX ADMIN — FAMOTIDINE 20 MG: 20 TABLET ORAL at 08:54

## 2021-12-16 RX ADMIN — BUDESONIDE AND FORMOTEROL FUMARATE DIHYDRATE 2 PUFF: 80; 4.5 AEROSOL RESPIRATORY (INHALATION) at 17:19

## 2021-12-16 RX ADMIN — FOLIC ACID 1 MG: 1 TABLET ORAL at 08:54

## 2021-12-16 RX ADMIN — DEXAMETHASONE SODIUM PHOSPHATE 10 MG: 4 INJECTION, SOLUTION INTRA-ARTICULAR; INTRALESIONAL; INTRAMUSCULAR; INTRAVENOUS; SOFT TISSUE at 21:04

## 2021-12-16 RX ADMIN — DOXYCYCLINE 100 MG: 100 CAPSULE ORAL at 08:53

## 2021-12-16 RX ADMIN — THIAMINE HCL TAB 100 MG 100 MG: 100 TAB at 08:54

## 2021-12-16 RX ADMIN — ALBUTEROL SULFATE 2 PUFF: 90 AEROSOL, METERED RESPIRATORY (INHALATION) at 11:26

## 2021-12-16 RX ADMIN — CEFTRIAXONE 1000 MG: 1 INJECTION, SOLUTION INTRAVENOUS at 15:03

## 2021-12-16 RX ADMIN — DEXAMETHASONE SODIUM PHOSPHATE 10 MG: 4 INJECTION, SOLUTION INTRA-ARTICULAR; INTRALESIONAL; INTRAMUSCULAR; INTRAVENOUS; SOFT TISSUE at 08:54

## 2021-12-16 RX ADMIN — ENOXAPARIN SODIUM 30 MG: 30 INJECTION SUBCUTANEOUS at 21:04

## 2021-12-16 RX ADMIN — BARICITINIB 4 MG: 2 TABLET, FILM COATED ORAL at 10:16

## 2021-12-16 RX ADMIN — ALBUTEROL SULFATE 2 PUFF: 90 AEROSOL, METERED RESPIRATORY (INHALATION) at 17:19

## 2021-12-16 RX ADMIN — ENOXAPARIN SODIUM 30 MG: 30 INJECTION SUBCUTANEOUS at 09:04

## 2021-12-16 RX ADMIN — BUDESONIDE AND FORMOTEROL FUMARATE DIHYDRATE 2 PUFF: 80; 4.5 AEROSOL RESPIRATORY (INHALATION) at 08:54

## 2021-12-16 RX ADMIN — DOXYCYCLINE 100 MG: 100 CAPSULE ORAL at 21:04

## 2021-12-17 LAB
ALBUMIN SERPL BCP-MCNC: 2.7 G/DL (ref 3.5–5)
ALP SERPL-CCNC: 70 U/L (ref 46–116)
ALT SERPL W P-5'-P-CCNC: 233 U/L (ref 12–78)
ANION GAP SERPL CALCULATED.3IONS-SCNC: 10 MMOL/L (ref 4–13)
AST SERPL W P-5'-P-CCNC: 113 U/L (ref 5–45)
BILIRUB SERPL-MCNC: 0.76 MG/DL (ref 0.2–1)
BUN SERPL-MCNC: 31 MG/DL (ref 5–25)
CALCIUM ALBUM COR SERPL-MCNC: 9.9 MG/DL (ref 8.3–10.1)
CALCIUM SERPL-MCNC: 8.9 MG/DL (ref 8.3–10.1)
CHLORIDE SERPL-SCNC: 102 MMOL/L (ref 100–108)
CO2 SERPL-SCNC: 27 MMOL/L (ref 21–32)
CREAT SERPL-MCNC: 1.07 MG/DL (ref 0.6–1.3)
ERYTHROCYTE [DISTWIDTH] IN BLOOD BY AUTOMATED COUNT: 12.8 % (ref 11.6–15.1)
GFR SERPL CREATININE-BSD FRML MDRD: 76 ML/MIN/1.73SQ M
GLUCOSE SERPL-MCNC: 125 MG/DL (ref 65–140)
HCT VFR BLD AUTO: 46.9 % (ref 36.5–49.3)
HGB BLD-MCNC: 15.6 G/DL (ref 12–17)
MCH RBC QN AUTO: 31.8 PG (ref 26.8–34.3)
MCHC RBC AUTO-ENTMCNC: 33.3 G/DL (ref 31.4–37.4)
MCV RBC AUTO: 96 FL (ref 82–98)
PLATELET # BLD AUTO: 452 THOUSANDS/UL (ref 149–390)
PMV BLD AUTO: 9.6 FL (ref 8.9–12.7)
POTASSIUM SERPL-SCNC: 4.6 MMOL/L (ref 3.5–5.3)
PROT SERPL-MCNC: 7.4 G/DL (ref 6.4–8.2)
RBC # BLD AUTO: 4.91 MILLION/UL (ref 3.88–5.62)
SODIUM SERPL-SCNC: 139 MMOL/L (ref 136–145)
WBC # BLD AUTO: 16.02 THOUSAND/UL (ref 4.31–10.16)

## 2021-12-17 PROCEDURE — 99232 SBSQ HOSP IP/OBS MODERATE 35: CPT | Performed by: FAMILY MEDICINE

## 2021-12-17 PROCEDURE — 80053 COMPREHEN METABOLIC PANEL: CPT | Performed by: FAMILY MEDICINE

## 2021-12-17 PROCEDURE — 85027 COMPLETE CBC AUTOMATED: CPT | Performed by: FAMILY MEDICINE

## 2021-12-17 PROCEDURE — 94760 N-INVAS EAR/PLS OXIMETRY 1: CPT

## 2021-12-17 RX ORDER — DOXYCYCLINE HYCLATE 100 MG/1
100 CAPSULE ORAL ONCE
Status: COMPLETED | OUTPATIENT
Start: 2021-12-17 | End: 2021-12-17

## 2021-12-17 RX ADMIN — FAMOTIDINE 20 MG: 20 TABLET ORAL at 08:56

## 2021-12-17 RX ADMIN — FOLIC ACID 1 MG: 1 TABLET ORAL at 08:56

## 2021-12-17 RX ADMIN — DEXAMETHASONE SODIUM PHOSPHATE 10 MG: 4 INJECTION, SOLUTION INTRA-ARTICULAR; INTRALESIONAL; INTRAMUSCULAR; INTRAVENOUS; SOFT TISSUE at 08:56

## 2021-12-17 RX ADMIN — ALBUTEROL SULFATE 2 PUFF: 90 AEROSOL, METERED RESPIRATORY (INHALATION) at 08:56

## 2021-12-17 RX ADMIN — BARICITINIB 4 MG: 2 TABLET, FILM COATED ORAL at 10:28

## 2021-12-17 RX ADMIN — ALBUTEROL SULFATE 2 PUFF: 90 AEROSOL, METERED RESPIRATORY (INHALATION) at 12:07

## 2021-12-17 RX ADMIN — ALBUTEROL SULFATE 2 PUFF: 90 AEROSOL, METERED RESPIRATORY (INHALATION) at 17:24

## 2021-12-17 RX ADMIN — ENOXAPARIN SODIUM 30 MG: 30 INJECTION SUBCUTANEOUS at 08:56

## 2021-12-17 RX ADMIN — DOXYCYCLINE 100 MG: 100 CAPSULE ORAL at 08:56

## 2021-12-17 RX ADMIN — DEXAMETHASONE SODIUM PHOSPHATE 10 MG: 4 INJECTION, SOLUTION INTRA-ARTICULAR; INTRALESIONAL; INTRAMUSCULAR; INTRAVENOUS; SOFT TISSUE at 21:51

## 2021-12-17 RX ADMIN — BUDESONIDE AND FORMOTEROL FUMARATE DIHYDRATE 2 PUFF: 80; 4.5 AEROSOL RESPIRATORY (INHALATION) at 09:01

## 2021-12-17 RX ADMIN — DOXYCYCLINE 100 MG: 100 CAPSULE ORAL at 21:51

## 2021-12-17 RX ADMIN — ENOXAPARIN SODIUM 30 MG: 30 INJECTION SUBCUTANEOUS at 21:51

## 2021-12-17 RX ADMIN — THIAMINE HCL TAB 100 MG 100 MG: 100 TAB at 08:56

## 2021-12-17 RX ADMIN — BUDESONIDE AND FORMOTEROL FUMARATE DIHYDRATE 2 PUFF: 80; 4.5 AEROSOL RESPIRATORY (INHALATION) at 17:24

## 2021-12-17 RX ADMIN — ALBUTEROL SULFATE 2 PUFF: 90 AEROSOL, METERED RESPIRATORY (INHALATION) at 21:51

## 2021-12-18 PROCEDURE — 99232 SBSQ HOSP IP/OBS MODERATE 35: CPT | Performed by: FAMILY MEDICINE

## 2021-12-18 PROCEDURE — 94760 N-INVAS EAR/PLS OXIMETRY 1: CPT

## 2021-12-18 RX ADMIN — THIAMINE HCL TAB 100 MG 100 MG: 100 TAB at 08:12

## 2021-12-18 RX ADMIN — DEXAMETHASONE SODIUM PHOSPHATE 10 MG: 4 INJECTION, SOLUTION INTRA-ARTICULAR; INTRALESIONAL; INTRAMUSCULAR; INTRAVENOUS; SOFT TISSUE at 21:27

## 2021-12-18 RX ADMIN — ALBUTEROL SULFATE 2 PUFF: 90 AEROSOL, METERED RESPIRATORY (INHALATION) at 21:27

## 2021-12-18 RX ADMIN — ALBUTEROL SULFATE 2 PUFF: 90 AEROSOL, METERED RESPIRATORY (INHALATION) at 08:12

## 2021-12-18 RX ADMIN — FOLIC ACID 1 MG: 1 TABLET ORAL at 08:12

## 2021-12-18 RX ADMIN — BUDESONIDE AND FORMOTEROL FUMARATE DIHYDRATE 2 PUFF: 80; 4.5 AEROSOL RESPIRATORY (INHALATION) at 17:12

## 2021-12-18 RX ADMIN — DEXAMETHASONE SODIUM PHOSPHATE 10 MG: 4 INJECTION, SOLUTION INTRA-ARTICULAR; INTRALESIONAL; INTRAMUSCULAR; INTRAVENOUS; SOFT TISSUE at 08:12

## 2021-12-18 RX ADMIN — ENOXAPARIN SODIUM 30 MG: 30 INJECTION SUBCUTANEOUS at 21:27

## 2021-12-18 RX ADMIN — ALBUTEROL SULFATE 2 PUFF: 90 AEROSOL, METERED RESPIRATORY (INHALATION) at 11:35

## 2021-12-18 RX ADMIN — BUDESONIDE AND FORMOTEROL FUMARATE DIHYDRATE 2 PUFF: 80; 4.5 AEROSOL RESPIRATORY (INHALATION) at 08:12

## 2021-12-18 RX ADMIN — ALBUTEROL SULFATE 2 PUFF: 90 AEROSOL, METERED RESPIRATORY (INHALATION) at 17:12

## 2021-12-18 RX ADMIN — FAMOTIDINE 20 MG: 20 TABLET ORAL at 08:12

## 2021-12-18 RX ADMIN — ENOXAPARIN SODIUM 30 MG: 30 INJECTION SUBCUTANEOUS at 08:12

## 2021-12-18 RX ADMIN — BARICITINIB 4 MG: 2 TABLET, FILM COATED ORAL at 11:35

## 2021-12-19 LAB
ALBUMIN SERPL BCP-MCNC: 2.8 G/DL (ref 3.5–5)
ALP SERPL-CCNC: 68 U/L (ref 46–116)
ALT SERPL W P-5'-P-CCNC: 547 U/L (ref 12–78)
ANION GAP SERPL CALCULATED.3IONS-SCNC: 7 MMOL/L (ref 4–13)
AST SERPL W P-5'-P-CCNC: 204 U/L (ref 5–45)
BILIRUB SERPL-MCNC: 1.05 MG/DL (ref 0.2–1)
BUN SERPL-MCNC: 29 MG/DL (ref 5–25)
CALCIUM ALBUM COR SERPL-MCNC: 9.4 MG/DL (ref 8.3–10.1)
CALCIUM SERPL-MCNC: 8.4 MG/DL (ref 8.3–10.1)
CHLORIDE SERPL-SCNC: 101 MMOL/L (ref 100–108)
CO2 SERPL-SCNC: 27 MMOL/L (ref 21–32)
CREAT SERPL-MCNC: 1.01 MG/DL (ref 0.6–1.3)
CRP SERPL QL: 0.6 MG/L
ERYTHROCYTE [DISTWIDTH] IN BLOOD BY AUTOMATED COUNT: 12.7 % (ref 11.6–15.1)
GFR SERPL CREATININE-BSD FRML MDRD: 81 ML/MIN/1.73SQ M
GLUCOSE SERPL-MCNC: 131 MG/DL (ref 65–140)
HCT VFR BLD AUTO: 47.5 % (ref 36.5–49.3)
HGB BLD-MCNC: 16.1 G/DL (ref 12–17)
MCH RBC QN AUTO: 32.8 PG (ref 26.8–34.3)
MCHC RBC AUTO-ENTMCNC: 33.9 G/DL (ref 31.4–37.4)
MCV RBC AUTO: 97 FL (ref 82–98)
PLATELET # BLD AUTO: 431 THOUSANDS/UL (ref 149–390)
PMV BLD AUTO: 10.4 FL (ref 8.9–12.7)
POTASSIUM SERPL-SCNC: 4.8 MMOL/L (ref 3.5–5.3)
PROT SERPL-MCNC: 7.4 G/DL (ref 6.4–8.2)
RBC # BLD AUTO: 4.91 MILLION/UL (ref 3.88–5.62)
SODIUM SERPL-SCNC: 135 MMOL/L (ref 136–145)
WBC # BLD AUTO: 18.62 THOUSAND/UL (ref 4.31–10.16)

## 2021-12-19 PROCEDURE — 80053 COMPREHEN METABOLIC PANEL: CPT | Performed by: FAMILY MEDICINE

## 2021-12-19 PROCEDURE — 86140 C-REACTIVE PROTEIN: CPT | Performed by: FAMILY MEDICINE

## 2021-12-19 PROCEDURE — 85027 COMPLETE CBC AUTOMATED: CPT | Performed by: FAMILY MEDICINE

## 2021-12-19 PROCEDURE — 99232 SBSQ HOSP IP/OBS MODERATE 35: CPT | Performed by: INTERNAL MEDICINE

## 2021-12-19 RX ADMIN — DEXAMETHASONE SODIUM PHOSPHATE 10 MG: 4 INJECTION, SOLUTION INTRA-ARTICULAR; INTRALESIONAL; INTRAMUSCULAR; INTRAVENOUS; SOFT TISSUE at 21:10

## 2021-12-19 RX ADMIN — FOLIC ACID 1 MG: 1 TABLET ORAL at 08:22

## 2021-12-19 RX ADMIN — ALBUTEROL SULFATE 2 PUFF: 90 AEROSOL, METERED RESPIRATORY (INHALATION) at 08:21

## 2021-12-19 RX ADMIN — BARICITINIB 4 MG: 2 TABLET, FILM COATED ORAL at 11:17

## 2021-12-19 RX ADMIN — ENOXAPARIN SODIUM 30 MG: 30 INJECTION SUBCUTANEOUS at 08:21

## 2021-12-19 RX ADMIN — ALBUTEROL SULFATE 2 PUFF: 90 AEROSOL, METERED RESPIRATORY (INHALATION) at 17:48

## 2021-12-19 RX ADMIN — ALBUTEROL SULFATE 2 PUFF: 90 AEROSOL, METERED RESPIRATORY (INHALATION) at 11:18

## 2021-12-19 RX ADMIN — ALBUTEROL SULFATE 2 PUFF: 90 AEROSOL, METERED RESPIRATORY (INHALATION) at 21:10

## 2021-12-19 RX ADMIN — FAMOTIDINE 20 MG: 20 TABLET ORAL at 08:22

## 2021-12-19 RX ADMIN — BUDESONIDE AND FORMOTEROL FUMARATE DIHYDRATE 2 PUFF: 80; 4.5 AEROSOL RESPIRATORY (INHALATION) at 08:22

## 2021-12-19 RX ADMIN — THIAMINE HCL TAB 100 MG 100 MG: 100 TAB at 08:22

## 2021-12-19 RX ADMIN — BUDESONIDE AND FORMOTEROL FUMARATE DIHYDRATE 2 PUFF: 80; 4.5 AEROSOL RESPIRATORY (INHALATION) at 17:48

## 2021-12-19 RX ADMIN — ENOXAPARIN SODIUM 30 MG: 30 INJECTION SUBCUTANEOUS at 21:17

## 2021-12-19 RX ADMIN — DEXAMETHASONE SODIUM PHOSPHATE 10 MG: 4 INJECTION, SOLUTION INTRA-ARTICULAR; INTRALESIONAL; INTRAMUSCULAR; INTRAVENOUS; SOFT TISSUE at 08:22

## 2021-12-20 VITALS
HEIGHT: 72 IN | WEIGHT: 200.88 LBS | SYSTOLIC BLOOD PRESSURE: 120 MMHG | RESPIRATION RATE: 18 BRPM | TEMPERATURE: 98.6 F | HEART RATE: 87 BPM | OXYGEN SATURATION: 94 % | BODY MASS INDEX: 27.21 KG/M2 | DIASTOLIC BLOOD PRESSURE: 93 MMHG

## 2021-12-20 LAB
ALBUMIN SERPL BCP-MCNC: 2.9 G/DL (ref 3.5–5)
ALP SERPL-CCNC: 73 U/L (ref 46–116)
ALT SERPL W P-5'-P-CCNC: 629 U/L (ref 12–78)
ANION GAP SERPL CALCULATED.3IONS-SCNC: 7 MMOL/L (ref 4–13)
AST SERPL W P-5'-P-CCNC: 190 U/L (ref 5–45)
BASOPHILS # BLD AUTO: 0.03 THOUSANDS/ΜL (ref 0–0.1)
BASOPHILS NFR BLD AUTO: 0 % (ref 0–1)
BILIRUB SERPL-MCNC: 1.08 MG/DL (ref 0.2–1)
BUN SERPL-MCNC: 28 MG/DL (ref 5–25)
CALCIUM ALBUM COR SERPL-MCNC: 9.6 MG/DL (ref 8.3–10.1)
CALCIUM SERPL-MCNC: 8.7 MG/DL (ref 8.3–10.1)
CHLORIDE SERPL-SCNC: 100 MMOL/L (ref 100–108)
CO2 SERPL-SCNC: 30 MMOL/L (ref 21–32)
CREAT SERPL-MCNC: 1.15 MG/DL (ref 0.6–1.3)
EOSINOPHIL # BLD AUTO: 0 THOUSAND/ΜL (ref 0–0.61)
EOSINOPHIL NFR BLD AUTO: 0 % (ref 0–6)
ERYTHROCYTE [DISTWIDTH] IN BLOOD BY AUTOMATED COUNT: 12.5 % (ref 11.6–15.1)
GFR SERPL CREATININE-BSD FRML MDRD: 69 ML/MIN/1.73SQ M
GLUCOSE SERPL-MCNC: 117 MG/DL (ref 65–140)
HCT VFR BLD AUTO: 46.7 % (ref 36.5–49.3)
HGB BLD-MCNC: 15.6 G/DL (ref 12–17)
IMM GRANULOCYTES # BLD AUTO: 0.27 THOUSAND/UL (ref 0–0.2)
IMM GRANULOCYTES NFR BLD AUTO: 2 % (ref 0–2)
LYMPHOCYTES # BLD AUTO: 0.9 THOUSANDS/ΜL (ref 0.6–4.47)
LYMPHOCYTES NFR BLD AUTO: 5 % (ref 14–44)
MCH RBC QN AUTO: 32.2 PG (ref 26.8–34.3)
MCHC RBC AUTO-ENTMCNC: 33.4 G/DL (ref 31.4–37.4)
MCV RBC AUTO: 96 FL (ref 82–98)
MONOCYTES # BLD AUTO: 0.89 THOUSAND/ΜL (ref 0.17–1.22)
MONOCYTES NFR BLD AUTO: 5 % (ref 4–12)
NEUTROPHILS # BLD AUTO: 16.14 THOUSANDS/ΜL (ref 1.85–7.62)
NEUTS SEG NFR BLD AUTO: 88 % (ref 43–75)
NRBC BLD AUTO-RTO: 0 /100 WBCS
PLATELET # BLD AUTO: 423 THOUSANDS/UL (ref 149–390)
PMV BLD AUTO: 10.2 FL (ref 8.9–12.7)
POTASSIUM SERPL-SCNC: 4.3 MMOL/L (ref 3.5–5.3)
PROT SERPL-MCNC: 7.3 G/DL (ref 6.4–8.2)
RBC # BLD AUTO: 4.85 MILLION/UL (ref 3.88–5.62)
SODIUM SERPL-SCNC: 137 MMOL/L (ref 136–145)
WBC # BLD AUTO: 18.23 THOUSAND/UL (ref 4.31–10.16)

## 2021-12-20 PROCEDURE — 80053 COMPREHEN METABOLIC PANEL: CPT | Performed by: INTERNAL MEDICINE

## 2021-12-20 PROCEDURE — 94664 DEMO&/EVAL PT USE INHALER: CPT

## 2021-12-20 PROCEDURE — 85025 COMPLETE CBC W/AUTO DIFF WBC: CPT | Performed by: INTERNAL MEDICINE

## 2021-12-20 PROCEDURE — 99239 HOSP IP/OBS DSCHRG MGMT >30: CPT | Performed by: INTERNAL MEDICINE

## 2021-12-20 RX ORDER — PREDNISONE 10 MG/1
TABLET ORAL
Qty: 18 TABLET | Refills: 0 | Status: SHIPPED | OUTPATIENT
Start: 2021-12-20 | End: 2021-12-20 | Stop reason: SDUPTHER

## 2021-12-20 RX ORDER — PREDNISONE 10 MG/1
TABLET ORAL
Qty: 18 TABLET | Refills: 0 | Status: SHIPPED | OUTPATIENT
Start: 2021-12-20 | End: 2022-01-13 | Stop reason: DRUGHIGH

## 2021-12-20 RX ADMIN — ENOXAPARIN SODIUM 30 MG: 30 INJECTION SUBCUTANEOUS at 08:49

## 2021-12-20 RX ADMIN — BUDESONIDE AND FORMOTEROL FUMARATE DIHYDRATE 2 PUFF: 80; 4.5 AEROSOL RESPIRATORY (INHALATION) at 08:50

## 2021-12-20 RX ADMIN — FAMOTIDINE 20 MG: 20 TABLET ORAL at 08:48

## 2021-12-20 RX ADMIN — DEXAMETHASONE SODIUM PHOSPHATE 10 MG: 4 INJECTION, SOLUTION INTRA-ARTICULAR; INTRALESIONAL; INTRAMUSCULAR; INTRAVENOUS; SOFT TISSUE at 08:49

## 2021-12-20 RX ADMIN — BARICITINIB 4 MG: 2 TABLET, FILM COATED ORAL at 12:04

## 2021-12-20 RX ADMIN — THIAMINE HCL TAB 100 MG 100 MG: 100 TAB at 08:49

## 2021-12-20 RX ADMIN — ALBUTEROL SULFATE 2 PUFF: 90 AEROSOL, METERED RESPIRATORY (INHALATION) at 08:47

## 2021-12-20 RX ADMIN — ALBUTEROL SULFATE 2 PUFF: 90 AEROSOL, METERED RESPIRATORY (INHALATION) at 12:04

## 2021-12-20 RX ADMIN — FOLIC ACID 1 MG: 1 TABLET ORAL at 08:48

## 2021-12-27 ENCOUNTER — APPOINTMENT (OUTPATIENT)
Dept: LAB | Facility: CLINIC | Age: 56
End: 2021-12-27
Payer: COMMERCIAL

## 2021-12-27 DIAGNOSIS — K76.9 PLEURAL EFFUSION ASSOCIATED WITH HEPATIC DISORDER: ICD-10-CM

## 2021-12-27 DIAGNOSIS — K75.9 RADIATION HEPATITIS: ICD-10-CM

## 2021-12-27 DIAGNOSIS — R73.01 IMPAIRED FASTING GLUCOSE: ICD-10-CM

## 2021-12-27 DIAGNOSIS — R74.8 ELEVATED LIVER ENZYMES: ICD-10-CM

## 2021-12-27 DIAGNOSIS — R79.89 ELEVATED LFTS: ICD-10-CM

## 2021-12-27 DIAGNOSIS — J91.8 PLEURAL EFFUSION ASSOCIATED WITH HEPATIC DISORDER: ICD-10-CM

## 2021-12-27 DIAGNOSIS — E78.00 PURE HYPERCHOLESTEROLEMIA: ICD-10-CM

## 2021-12-27 DIAGNOSIS — Z11.59 SCREENING EXAMINATION FOR POLIOMYELITIS: ICD-10-CM

## 2021-12-27 LAB
EST. AVERAGE GLUCOSE BLD GHB EST-MCNC: 123 MG/DL
HBA1C MFR BLD: 5.9 %
INR PPP: 0.96 (ref 0.84–1.19)
PROTHROMBIN TIME: 12.4 SECONDS (ref 11.6–14.5)

## 2021-12-27 PROCEDURE — 80061 LIPID PANEL: CPT

## 2021-12-27 PROCEDURE — 85610 PROTHROMBIN TIME: CPT

## 2021-12-27 PROCEDURE — 86803 HEPATITIS C AB TEST: CPT

## 2021-12-27 PROCEDURE — 85025 COMPLETE CBC W/AUTO DIFF WBC: CPT

## 2021-12-27 PROCEDURE — 36415 COLL VENOUS BLD VENIPUNCTURE: CPT

## 2021-12-27 PROCEDURE — 80053 COMPREHEN METABOLIC PANEL: CPT

## 2021-12-27 PROCEDURE — 87340 HEPATITIS B SURFACE AG IA: CPT

## 2021-12-27 PROCEDURE — 86706 HEP B SURFACE ANTIBODY: CPT

## 2021-12-27 PROCEDURE — 83036 HEMOGLOBIN GLYCOSYLATED A1C: CPT

## 2021-12-28 LAB
ALBUMIN SERPL BCP-MCNC: 3.2 G/DL (ref 3.5–5)
ALP SERPL-CCNC: 67 U/L (ref 46–116)
ALT SERPL W P-5'-P-CCNC: 216 U/L (ref 12–78)
ANION GAP SERPL CALCULATED.3IONS-SCNC: 7 MMOL/L (ref 4–13)
AST SERPL W P-5'-P-CCNC: 62 U/L (ref 5–45)
BASOPHILS # BLD AUTO: 0.03 THOUSANDS/ΜL (ref 0–0.1)
BASOPHILS NFR BLD AUTO: 0 % (ref 0–1)
BILIRUB SERPL-MCNC: 0.87 MG/DL (ref 0.2–1)
BUN SERPL-MCNC: 18 MG/DL (ref 5–25)
CALCIUM ALBUM COR SERPL-MCNC: 10.2 MG/DL (ref 8.3–10.1)
CALCIUM SERPL-MCNC: 9.6 MG/DL (ref 8.3–10.1)
CHLORIDE SERPL-SCNC: 101 MMOL/L (ref 100–108)
CHOLEST SERPL-MCNC: 262 MG/DL
CO2 SERPL-SCNC: 29 MMOL/L (ref 21–32)
CREAT SERPL-MCNC: 1.08 MG/DL (ref 0.6–1.3)
EOSINOPHIL # BLD AUTO: 0.23 THOUSAND/ΜL (ref 0–0.61)
EOSINOPHIL NFR BLD AUTO: 2 % (ref 0–6)
ERYTHROCYTE [DISTWIDTH] IN BLOOD BY AUTOMATED COUNT: 13.3 % (ref 11.6–15.1)
GFR SERPL CREATININE-BSD FRML MDRD: 75 ML/MIN/1.73SQ M
GLUCOSE P FAST SERPL-MCNC: 93 MG/DL (ref 65–99)
HBV SURFACE AB SER-ACNC: 83.04 MIU/ML
HBV SURFACE AG SER QL: NORMAL
HCT VFR BLD AUTO: 41.2 % (ref 36.5–49.3)
HCV AB SER QL: NORMAL
HDLC SERPL-MCNC: 58 MG/DL
HGB BLD-MCNC: 14 G/DL (ref 12–17)
IMM GRANULOCYTES # BLD AUTO: 0.2 THOUSAND/UL (ref 0–0.2)
IMM GRANULOCYTES NFR BLD AUTO: 2 % (ref 0–2)
LDLC SERPL CALC-MCNC: 161 MG/DL (ref 0–100)
LYMPHOCYTES # BLD AUTO: 1.87 THOUSANDS/ΜL (ref 0.6–4.47)
LYMPHOCYTES NFR BLD AUTO: 18 % (ref 14–44)
MCH RBC QN AUTO: 32.8 PG (ref 26.8–34.3)
MCHC RBC AUTO-ENTMCNC: 34 G/DL (ref 31.4–37.4)
MCV RBC AUTO: 97 FL (ref 82–98)
MONOCYTES # BLD AUTO: 0.88 THOUSAND/ΜL (ref 0.17–1.22)
MONOCYTES NFR BLD AUTO: 8 % (ref 4–12)
NEUTROPHILS # BLD AUTO: 7.47 THOUSANDS/ΜL (ref 1.85–7.62)
NEUTS SEG NFR BLD AUTO: 70 % (ref 43–75)
NONHDLC SERPL-MCNC: 204 MG/DL
NRBC BLD AUTO-RTO: 0 /100 WBCS
PLATELET # BLD AUTO: 238 THOUSANDS/UL (ref 149–390)
PMV BLD AUTO: 10.3 FL (ref 8.9–12.7)
POTASSIUM SERPL-SCNC: 3.9 MMOL/L (ref 3.5–5.3)
PROT SERPL-MCNC: 7.3 G/DL (ref 6.4–8.2)
RBC # BLD AUTO: 4.27 MILLION/UL (ref 3.88–5.62)
SODIUM SERPL-SCNC: 137 MMOL/L (ref 136–145)
TRIGL SERPL-MCNC: 217 MG/DL
WBC # BLD AUTO: 10.68 THOUSAND/UL (ref 4.31–10.16)

## 2022-01-03 ENCOUNTER — TELEPHONE (OUTPATIENT)
Dept: GASTROENTEROLOGY | Facility: CLINIC | Age: 57
End: 2022-01-03

## 2022-01-03 NOTE — TELEPHONE ENCOUNTER
Due to recent COVID infection, patient had to reschedule scopes with Dr Westley Mays from 01/04/22 to 03/01/22  Patient already has prep and instructions

## 2022-01-12 DIAGNOSIS — U07.1 COVID: Primary | ICD-10-CM

## 2022-01-13 ENCOUNTER — APPOINTMENT (OUTPATIENT)
Dept: RADIOLOGY | Facility: CLINIC | Age: 57
End: 2022-01-13
Payer: COMMERCIAL

## 2022-01-13 ENCOUNTER — OFFICE VISIT (OUTPATIENT)
Dept: PULMONOLOGY | Facility: CLINIC | Age: 57
End: 2022-01-13
Payer: COMMERCIAL

## 2022-01-13 VITALS
WEIGHT: 217.8 LBS | HEIGHT: 72 IN | DIASTOLIC BLOOD PRESSURE: 90 MMHG | SYSTOLIC BLOOD PRESSURE: 128 MMHG | BODY MASS INDEX: 29.5 KG/M2 | HEART RATE: 79 BPM | TEMPERATURE: 97.9 F | OXYGEN SATURATION: 96 %

## 2022-01-13 DIAGNOSIS — J45.20 MILD INTERMITTENT ASTHMA, UNCOMPLICATED: ICD-10-CM

## 2022-01-13 DIAGNOSIS — G47.33 OSA (OBSTRUCTIVE SLEEP APNEA): ICD-10-CM

## 2022-01-13 DIAGNOSIS — U07.1 COVID: ICD-10-CM

## 2022-01-13 DIAGNOSIS — U07.1 COVID: Primary | ICD-10-CM

## 2022-01-13 PROBLEM — J96.01 ACUTE RESPIRATORY FAILURE WITH HYPOXIA (HCC): Status: RESOLVED | Noted: 2021-12-13 | Resolved: 2022-01-13

## 2022-01-13 PROCEDURE — 71046 X-RAY EXAM CHEST 2 VIEWS: CPT

## 2022-01-13 PROCEDURE — 99214 OFFICE O/P EST MOD 30 MIN: CPT | Performed by: INTERNAL MEDICINE

## 2022-01-13 RX ORDER — PREDNISONE 20 MG/1
TABLET ORAL
Qty: 31 TABLET | Refills: 0 | Status: SHIPPED | OUTPATIENT
Start: 2022-01-13 | End: 2022-03-01 | Stop reason: ALTCHOICE

## 2022-01-13 RX ORDER — METOPROLOL SUCCINATE 25 MG/1
TABLET, EXTENDED RELEASE ORAL
COMMUNITY
Start: 2022-01-05 | End: 2022-03-01 | Stop reason: ALTCHOICE

## 2022-01-13 NOTE — ASSESSMENT & PLAN NOTE
Extensive COVID 19 pneumonia in December requiring 2 weeks of hospitalization  Now patient is slowly recovering but still has some dyspnea on exertion and fatigue  Chest x-ray done today shows persistent x-ray abnormality suggesting organizing pneumonia  I recommend prednisone for 3 weeks as is standard recommendation now  Continue regular exercise  From my standpoint patient can return to work when he feels physically able to do so

## 2022-01-13 NOTE — PROGRESS NOTES
Assessment/Plan:    COVID  Extensive COVID 19 pneumonia in December requiring 2 weeks of hospitalization  Now patient is slowly recovering but still has some dyspnea on exertion and fatigue  Chest x-ray done today shows persistent x-ray abnormality suggesting organizing pneumonia  I recommend prednisone for 3 weeks as is standard recommendation now  Continue regular exercise  From my standpoint patient can return to work when he feels physically able to do so  KWAKU (obstructive sleep apnea)  Not addressed adequately on this visit  Diagnoses and all orders for this visit:    COVID  -     predniSONE 20 mg tablet; 2 daily for 1 week, then 1 1/2 daily for a week then 1 daily for a week    Mild intermittent asthma, uncomplicated    KWAKU (obstructive sleep apnea)    Other orders  -     metoprolol succinate (TOPROL-XL) 25 mg 24 hr tablet          Subjective:      Patient ID: Kajal Rasmussen is a 60 y o  male  I last met this patient about 9 years ago when he was diagnosed with asthma  Since that time he has been following with Dr Savannah King in the allergy department  Became ill with the COVID-19 late in November and required hospitalization on December 10th  Discharged December 20th  Had profound weakness upon discharge  Improving fatigue at this point  Still takes naps twice daily  Dyspnea on exertion was quite significant last week but improving this week  Not much cough  No taste or smell changes  Brain fog initially but now gone  No taste or smell abnormalities  Never vaccinated  Chest x-ray done today shows bilateral ground-glass type opacities suggesting persistent COVID pneumonia    30 minute visit with more than 50% discussion      The following portions of the patient's history were reviewed and updated as appropriate: allergies, current medications, past family history, past medical history, past social history, past surgical history and problem list     Review of Systems Constitutional: Positive for activity change  Negative for unexpected weight change  HENT: Negative for congestion and sinus pressure  Respiratory: Positive for apnea (May be) and shortness of breath  Negative for cough and wheezing  Cardiovascular: Negative for chest pain  Gastrointestinal: Negative for abdominal pain  Musculoskeletal: Negative  Skin: Negative  Neurological: Positive for dizziness ( orthostasis early in recovery)  Objective:      /90 (BP Location: Left arm, Patient Position: Sitting, Cuff Size: Standard)   Pulse 79   Temp 97 9 °F (36 6 °C) (Tympanic)   Ht 6' (1 829 m)   Wt 98 8 kg (217 lb 12 8 oz)   SpO2 96%   BMI 29 54 kg/m²          Physical Exam  Vitals reviewed  Constitutional:       Appearance: He is normal weight  He is not ill-appearing  Neck:      Vascular: No JVD  Cardiovascular:      Rate and Rhythm: Normal rate and regular rhythm  Pulses:           Radial pulses are 2+ on the right side and 2+ on the left side  Heart sounds: Normal heart sounds  Pulmonary:      Effort: Pulmonary effort is normal       Breath sounds: Normal breath sounds  No wheezing, rhonchi or rales  Abdominal:      General: Abdomen is flat  There is no distension  Palpations: Abdomen is soft  There is no hepatomegaly or splenomegaly  Musculoskeletal:         General: Swelling present  Cervical back: No rigidity or tenderness  Right lower leg: No edema  Left lower leg: No edema  Lymphadenopathy:      Cervical: No cervical adenopathy  Skin:     General: Skin is warm and dry  Neurological:      Mental Status: He is alert and oriented to person, place, and time     Psychiatric:         Mood and Affect: Mood normal          Behavior: Behavior normal

## 2022-02-14 ENCOUNTER — TELEPHONE (OUTPATIENT)
Dept: OTHER | Facility: OTHER | Age: 57
End: 2022-02-14

## 2022-02-14 NOTE — TELEPHONE ENCOUNTER
Patient called to confirm his new date 3/1/2022 for his colonoscopy  He wanted to double check if blood work is needed, also instructions on the prep and where he would get the prep needed       Please give patient a call back to address question

## 2022-02-15 ENCOUNTER — ANESTHESIA (OUTPATIENT)
Dept: ANESTHESIOLOGY | Facility: HOSPITAL | Age: 57
End: 2022-02-15

## 2022-02-15 ENCOUNTER — ANESTHESIA EVENT (OUTPATIENT)
Dept: ANESTHESIOLOGY | Facility: HOSPITAL | Age: 57
End: 2022-02-15

## 2022-02-28 ENCOUNTER — TELEPHONE (OUTPATIENT)
Dept: GASTROENTEROLOGY | Facility: AMBULARY SURGERY CENTER | Age: 57
End: 2022-02-28

## 2022-03-01 ENCOUNTER — HOSPITAL ENCOUNTER (OUTPATIENT)
Dept: GASTROENTEROLOGY | Facility: AMBULARY SURGERY CENTER | Age: 57
Setting detail: OUTPATIENT SURGERY
Discharge: HOME | End: 2022-03-01
Attending: INTERNAL MEDICINE
Payer: COMMERCIAL

## 2022-03-01 ENCOUNTER — ANESTHESIA (OUTPATIENT)
Dept: GASTROENTEROLOGY | Facility: AMBULARY SURGERY CENTER | Age: 57
End: 2022-03-01

## 2022-03-01 ENCOUNTER — ANESTHESIA EVENT (OUTPATIENT)
Dept: GASTROENTEROLOGY | Facility: AMBULARY SURGERY CENTER | Age: 57
End: 2022-03-01

## 2022-03-01 VITALS
WEIGHT: 222 LBS | SYSTOLIC BLOOD PRESSURE: 142 MMHG | OXYGEN SATURATION: 97 % | RESPIRATION RATE: 19 BRPM | HEART RATE: 81 BPM | TEMPERATURE: 97.1 F | BODY MASS INDEX: 30.07 KG/M2 | HEIGHT: 72 IN | DIASTOLIC BLOOD PRESSURE: 99 MMHG

## 2022-03-01 DIAGNOSIS — K21.9 GASTROESOPHAGEAL REFLUX DISEASE, UNSPECIFIED WHETHER ESOPHAGITIS PRESENT: ICD-10-CM

## 2022-03-01 DIAGNOSIS — R19.8 TENESMUS: ICD-10-CM

## 2022-03-01 DIAGNOSIS — Z86.010 HISTORY OF COLON POLYPS: ICD-10-CM

## 2022-03-01 PROCEDURE — 45380 COLONOSCOPY AND BIOPSY: CPT | Performed by: INTERNAL MEDICINE

## 2022-03-01 PROCEDURE — 88305 TISSUE EXAM BY PATHOLOGIST: CPT | Performed by: PATHOLOGY

## 2022-03-01 PROCEDURE — 45385 COLONOSCOPY W/LESION REMOVAL: CPT | Performed by: INTERNAL MEDICINE

## 2022-03-01 PROCEDURE — 43239 EGD BIOPSY SINGLE/MULTIPLE: CPT | Performed by: INTERNAL MEDICINE

## 2022-03-01 RX ORDER — PROMETHAZINE HYDROCHLORIDE 25 MG/ML
12.5 INJECTION, SOLUTION INTRAMUSCULAR; INTRAVENOUS ONCE AS NEEDED
Status: CANCELLED | OUTPATIENT
Start: 2022-03-01

## 2022-03-01 RX ORDER — MEPERIDINE HYDROCHLORIDE 25 MG/ML
12.5 INJECTION INTRAMUSCULAR; INTRAVENOUS; SUBCUTANEOUS
Status: CANCELLED | OUTPATIENT
Start: 2022-03-01

## 2022-03-01 RX ORDER — HYDROMORPHONE HCL 110MG/55ML
0.5 PATIENT CONTROLLED ANALGESIA SYRINGE INTRAVENOUS
Status: CANCELLED | OUTPATIENT
Start: 2022-03-01

## 2022-03-01 RX ORDER — PROPOFOL 10 MG/ML
INJECTION, EMULSION INTRAVENOUS AS NEEDED
Status: DISCONTINUED | OUTPATIENT
Start: 2022-03-01 | End: 2022-03-01

## 2022-03-01 RX ORDER — ONDANSETRON 2 MG/ML
4 INJECTION INTRAMUSCULAR; INTRAVENOUS ONCE AS NEEDED
Status: CANCELLED | OUTPATIENT
Start: 2022-03-01

## 2022-03-01 RX ORDER — ALBUTEROL SULFATE 2.5 MG/3ML
2.5 SOLUTION RESPIRATORY (INHALATION) ONCE AS NEEDED
Status: CANCELLED | OUTPATIENT
Start: 2022-03-01

## 2022-03-01 RX ORDER — LIDOCAINE HYDROCHLORIDE 20 MG/ML
INJECTION, SOLUTION EPIDURAL; INFILTRATION; INTRACAUDAL; PERINEURAL AS NEEDED
Status: DISCONTINUED | OUTPATIENT
Start: 2022-03-01 | End: 2022-03-01

## 2022-03-01 RX ORDER — FENTANYL CITRATE/PF 50 MCG/ML
25 SYRINGE (ML) INJECTION
Status: CANCELLED | OUTPATIENT
Start: 2022-03-01

## 2022-03-01 RX ORDER — SODIUM CHLORIDE, SODIUM LACTATE, POTASSIUM CHLORIDE, CALCIUM CHLORIDE 600; 310; 30; 20 MG/100ML; MG/100ML; MG/100ML; MG/100ML
INJECTION, SOLUTION INTRAVENOUS CONTINUOUS PRN
Status: DISCONTINUED | OUTPATIENT
Start: 2022-03-01 | End: 2022-03-01

## 2022-03-01 RX ADMIN — PROPOFOL 20 MG: 10 INJECTION, EMULSION INTRAVENOUS at 13:31

## 2022-03-01 RX ADMIN — PROPOFOL 20 MG: 10 INJECTION, EMULSION INTRAVENOUS at 13:17

## 2022-03-01 RX ADMIN — PROPOFOL 20 MG: 10 INJECTION, EMULSION INTRAVENOUS at 13:23

## 2022-03-01 RX ADMIN — LIDOCAINE HYDROCHLORIDE 100 MG: 20 INJECTION, SOLUTION EPIDURAL; INFILTRATION; INTRACAUDAL at 13:10

## 2022-03-01 RX ADMIN — PROPOFOL 20 MG: 10 INJECTION, EMULSION INTRAVENOUS at 13:21

## 2022-03-01 RX ADMIN — PROPOFOL 20 MG: 10 INJECTION, EMULSION INTRAVENOUS at 13:27

## 2022-03-01 RX ADMIN — PROPOFOL 20 MG: 10 INJECTION, EMULSION INTRAVENOUS at 13:33

## 2022-03-01 RX ADMIN — PROPOFOL 100 MG: 10 INJECTION, EMULSION INTRAVENOUS at 13:10

## 2022-03-01 RX ADMIN — PROPOFOL 50 MG: 10 INJECTION, EMULSION INTRAVENOUS at 13:11

## 2022-03-01 RX ADMIN — Medication 40 MG: at 13:28

## 2022-03-01 RX ADMIN — PROPOFOL 20 MG: 10 INJECTION, EMULSION INTRAVENOUS at 13:34

## 2022-03-01 RX ADMIN — PROPOFOL 20 MG: 10 INJECTION, EMULSION INTRAVENOUS at 13:38

## 2022-03-01 RX ADMIN — PROPOFOL 20 MG: 10 INJECTION, EMULSION INTRAVENOUS at 13:35

## 2022-03-01 RX ADMIN — SODIUM CHLORIDE, SODIUM LACTATE, POTASSIUM CHLORIDE, AND CALCIUM CHLORIDE: .6; .31; .03; .02 INJECTION, SOLUTION INTRAVENOUS at 13:07

## 2022-03-01 RX ADMIN — PROPOFOL 20 MG: 10 INJECTION, EMULSION INTRAVENOUS at 13:36

## 2022-03-01 RX ADMIN — PROPOFOL 30 MG: 10 INJECTION, EMULSION INTRAVENOUS at 13:12

## 2022-03-01 RX ADMIN — PROPOFOL 20 MG: 10 INJECTION, EMULSION INTRAVENOUS at 13:37

## 2022-03-01 RX ADMIN — PROPOFOL 20 MG: 10 INJECTION, EMULSION INTRAVENOUS at 13:14

## 2022-03-01 RX ADMIN — PROPOFOL 20 MG: 10 INJECTION, EMULSION INTRAVENOUS at 13:29

## 2022-03-01 RX ADMIN — PROPOFOL 20 MG: 10 INJECTION, EMULSION INTRAVENOUS at 13:25

## 2022-03-01 RX ADMIN — PROPOFOL 20 MG: 10 INJECTION, EMULSION INTRAVENOUS at 13:19

## 2022-03-01 NOTE — ANESTHESIA POSTPROCEDURE EVALUATION
Post-Op Assessment Note    CV Status:  Stable  Pain Score: 0    Pain management: adequate     Mental Status:  Alert   Hydration Status:  Euvolemic   PONV Controlled:  None   Airway Patency:  Patent      Post Op Vitals Reviewed: Yes      Staff: CRNA         No complications documented      /84 (03/01/22 1346)    Temp (!) 97 1 °F (36 2 °C) (03/01/22 1346)    Pulse 86 (03/01/22 1346)   Resp 15 (03/01/22 1346)    SpO2 97 % (03/01/22 1346)

## 2022-03-01 NOTE — ANESTHESIA PREPROCEDURE EVALUATION
Procedure:  COLONOSCOPY  EGD    Relevant Problems   GI/HEPATIC   (+) Gastroesophageal reflux disease      /RENAL   (+) BPH without obstruction/lower urinary tract symptoms      NEURO/PSYCH   (+) History of colon polyps      PULMONARY   (+) Mild intermittent asthma without complication   (+) KWAKU (obstructive sleep apnea)        Physical Exam    Airway    Mallampati score: II  TM Distance: >3 FB  Neck ROM: full     Dental       Cardiovascular  Cardiovascular exam normal    Pulmonary  Pulmonary exam normal     Other Findings        Anesthesia Plan  ASA Score- 2     Anesthesia Type- IV sedation with anesthesia with ASA Monitors  Additional Monitors:   Airway Plan:           Plan Factors-Exercise tolerance (METS): >4 METS  Chart reviewed  EKG reviewed  Imaging results reviewed  Existing labs reviewed  Patient summary reviewed  Induction- intravenous  Postoperative Plan- Plan for postoperative opioid use  Planned trial extubation    Informed Consent- Anesthetic plan and risks discussed with patient  I personally reviewed this patient with the CRNA  Discussed and agreed on the Anesthesia Plan with the EDMOND Berumen

## 2022-03-01 NOTE — H&P
History and Physical - [facility]  Bashir Tran 60 y o  male MRN: [id number]    HPI: Bashir Tran is a 60y o  year old male who presents for evaluation of GERD and colon cancer screening  History of colon polyps  Review of Systems    Historical Information   Past Medical History:   Diagnosis Date    Asthma     Colon polyp     COVID-19 2021    GERD (gastroesophageal reflux disease)     IBS (irritable bowel syndrome)      Past Surgical History:   Procedure Laterality Date    COLONOSCOPY      KNEE SURGERY      [place] COLONOSCOPY FLX DX W/COLLJ SPEC WHEN PFRMD N/A 3/20/2017    Procedure: COLONOSCOPY;  Surgeon: Juan Muhammad MD;  Location: [facility]; Service: Gastroenterology    TONSILLECTOMY      UPPER GASTROINTESTINAL ENDOSCOPY       Social History   Social History     Substance and Sexual Activity   Alcohol Use Yes    Alcohol/week: 2 0 standard drinks    Types: 2 Cans of beer per week    Comment: OCCASSIONAL     Social History     Substance and Sexual Activity   Drug Use No     Social History     Tobacco Use   Smoking Status Former Smoker    Packs/day: 1 00    Years: 30 00    Pack years: 30 00    Quit date: 10/2016    Years since quittin 4   Smokeless Tobacco Never Used     Family History   Problem Relation Age of Onset    Diabetes Father     Heart disease Father     Hypertension Father     Cancer Sister         ovarian       Meds/Allergies     (Not in a hospital admission)      No Known Allergies    Objective     /89   Pulse 77 Comment: sr  Temp 97 5 °F (36 4 °C) (Temporal)   Resp 20   Ht 6' (1 829 m)   Wt 101 kg (222 lb)   SpO2 96%   BMI 30 11 kg/m²       PHYSICAL EXAM    Gen: NAD  CV: RRR  CHEST: Clear  ABD: soft, NT/ND  EXT: no edema  Neuro: AAO      ASSESSMENT/PLAN:  This is a 60y o  year old male here for evaluation of GERD, has history of colon polyps  PLAN:   Procedure:  EGD and colonoscopy

## 2022-03-08 ENCOUNTER — TELEPHONE (OUTPATIENT)
Dept: GASTROENTEROLOGY | Facility: AMBULARY SURGERY CENTER | Age: 57
End: 2022-03-08

## 2022-03-08 NOTE — TELEPHONE ENCOUNTER
Pt aware of results and verbalized understanding  Advised to call office with any questions or concerns  Recall set

## 2022-03-08 NOTE — TELEPHONE ENCOUNTER
----- Message from Elicia Tierney MD sent at 3/8/2022  7:42 AM EST -----   there is no evidence of celiac disease, no evidence of H pylori bacteria  Recommend Pepcid on daily basis instead of as needed basis  Avoid NSAIDs  One of the polyps in the colon was tubular adenoma may be a precancerous polyp, other was completely benign  Recommend colonoscopy in 5 years

## 2022-04-12 ENCOUNTER — OFFICE VISIT (OUTPATIENT)
Dept: UROLOGY | Facility: CLINIC | Age: 57
End: 2022-04-12
Payer: COMMERCIAL

## 2022-04-12 VITALS
DIASTOLIC BLOOD PRESSURE: 92 MMHG | SYSTOLIC BLOOD PRESSURE: 130 MMHG | HEIGHT: 72 IN | BODY MASS INDEX: 31.29 KG/M2 | WEIGHT: 231 LBS

## 2022-04-12 DIAGNOSIS — N52.8 OTHER MALE ERECTILE DYSFUNCTION: Primary | ICD-10-CM

## 2022-04-12 PROCEDURE — 99213 OFFICE O/P EST LOW 20 MIN: CPT | Performed by: PHYSICIAN ASSISTANT

## 2022-04-12 RX ORDER — TADALAFIL 20 MG/1
20 TABLET ORAL DAILY PRN
Qty: 30 TABLET | Refills: 3 | Status: SHIPPED | OUTPATIENT
Start: 2022-04-12

## 2022-04-12 RX ORDER — SILDENAFIL 100 MG/1
100 TABLET, FILM COATED ORAL DAILY PRN
Qty: 30 TABLET | Refills: 3 | Status: SHIPPED | OUTPATIENT
Start: 2022-04-12

## 2022-04-12 NOTE — PROGRESS NOTES
UROLOGY PROGRESS NOTE   Patient Identifiers: Wesley Ray (MRN [id number])  Date of Service: 4/12/2022    Subjective:     12-year-old man history of BPH with obstruction  His PSA last year was 2 2  Current PSA is pending  Uses Viagra 100 mg p r n No significant outlet complaints  Reason for visit:  BPH follow-up      Objective:     VITALS:    There were no vitals filed for this visit  LABS:  Lab Results   Component Value Date    HGB 14 0 12/27/2021    HCT 41 2 12/27/2021    WBC 10 68 (H) 12/27/2021     12/27/2021   ]    Lab Results   Component Value Date    K 3 9 12/27/2021     12/27/2021    CO2 29 12/27/2021    BUN 18 12/27/2021    CREATININE 1 08 12/27/2021    CALCIUM 9 6 12/27/2021   ]        INPATIENT MEDS:    Current Outpatient Medications:     albuterol (PROVENTIL HFA,VENTOLIN HFA) 90 mcg/act inhaler, , Disp: , Rfl:     ALPRAZolam (XANAX) 0 5 mg tablet, TAKE 1 TAB TWICE DAILY AS NEEDED, Disp: , Rfl: 1    famotidine (PEPCID) 40 MG tablet, TAKE 1 TABLET BY MOUTH EVERY DAY, Disp: 30 tablet, Rfl: 2    fluticasone-salmeterol (ADVAIR HFA) 115-21 MCG/ACT inhaler, Inhale 2 puffs 2 (two) times a day Rinse mouth after use , Disp: , Rfl:     milk thistle 175 MG tablet, Take 175 mg by mouth daily, Disp: , Rfl:     Multiple Vitamin (MULTIVITAMIN) capsule, Take 1 capsule by mouth daily, Disp: , Rfl:       Physical Exam:   There were no vitals taken for this visit  GEN: no acute distress    RESP: breathing comfortably with no accessory muscle use    ABD: soft, non-tender, non-distended   INCISION:    EXT: no significant peripheral edema   (Male): Penis circumcised, phallus normal, meatus patent  Testicles descended into scrotum bilaterally without masses nor tenderness  No inguinal hernias bilaterally  GT: Prostate is enlarged at 35 grams  The prostate is not boggy  The prostate is not tender  No nodules noted      RADIOLOGY:    none     Assessment:     1  BPH   2   Erectile dysfunction     Plan:   - follow-up 1 year with PSA prior to visit-  -  -

## 2022-05-08 DIAGNOSIS — K21.9 GASTROESOPHAGEAL REFLUX DISEASE WITHOUT ESOPHAGITIS: ICD-10-CM

## 2022-05-08 RX ORDER — FAMOTIDINE 40 MG/1
TABLET, FILM COATED ORAL
Qty: 30 TABLET | Refills: 2 | Status: SHIPPED | OUTPATIENT
Start: 2022-05-08

## 2022-10-11 ENCOUNTER — OFFICE VISIT (OUTPATIENT)
Dept: URGENT CARE | Facility: CLINIC | Age: 57
End: 2022-10-11
Payer: COMMERCIAL

## 2022-10-11 VITALS — TEMPERATURE: 98 F | HEART RATE: 81 BPM | OXYGEN SATURATION: 98 %

## 2022-10-11 DIAGNOSIS — J06.9 VIRAL UPPER RESPIRATORY TRACT INFECTION: Primary | ICD-10-CM

## 2022-10-11 DIAGNOSIS — Z11.52 ENCOUNTER FOR SCREENING FOR COVID-19: ICD-10-CM

## 2022-10-11 PROCEDURE — 99213 OFFICE O/P EST LOW 20 MIN: CPT | Performed by: NURSE PRACTITIONER

## 2022-10-11 PROCEDURE — 87636 SARSCOV2 & INF A&B AMP PRB: CPT | Performed by: NURSE PRACTITIONER

## 2022-10-11 RX ORDER — GUAIFENESIN AND CODEINE PHOSPHATE 100; 10 MG/5ML; MG/5ML
SOLUTION ORAL
Qty: 180 ML | Refills: 0 | Status: SHIPPED | OUTPATIENT
Start: 2022-10-11

## 2022-10-11 NOTE — PATIENT INSTRUCTIONS
--Rest, drink plenty of fluids  --COVID and flu testing sent  Will call with results  Average turn around time is 24-48 hours  --Advise self-quarantining until you hear back from us regarding test results (at the earliest)  This involves not leaving your house, wearing a mask at all times while outside your immediate living area, and disinfecting all commonly used surfaces and personal items  If your COVID test results are positive, you will need to self-quarantine at home for at least 5 days from the onset of your symptoms, until you are feeling better, and until you are without a fever for at least 24 hours  --For nasal/sinus congestion, helpful measures include steam, warm compresses, an OTC saline nasal spray or Neti pot, or an OTC decongestant (such as Sudafed)  The decongestant should be avoided, however, if you are under 10years of age, or have a history of high blood pressure or heart disease  In addition, an OTC nasal steroid (Flonase, Nasocort) or nasal decongestant (Afrin, [place]-synephrine) may be taken  The nasal steroid should be used at bedtime, after the saline nasal spray  The nasal decongestant should not be taken more than 3 days consecutively in order to prevent rebound congestion  --For nasal drainage, postnasal drip, sneezing and itching, an OTC antihistamine (Allegra, Benadryl, etc) can be taken  --For cough, you can take an OTC expectorant such as plain Robitussion or Mucinex (active ingredient guaifenesin)  A spoonful of honey at bedtime may also be helpful, as may a prescription cough medicine  Also recommended is the use of a cool mist humidifier (with or without Vicks) in the bedroom at night  --For sore throat, you can take OTC lozenges, use warm gargles (salt water or apple cider vinegar and honey), herbal teas, or an OTC throat spray (Chloraseptic)  --You can take Tylenol or Motrin/Advil as needed for fever, headache, body aches   Motrin/Advil should be avoided, however, if you have a history of heart disease, bleeding ulcers, or if you take blood thinners  --You should contact your primary care provider and/or go to the ER if your symptoms are not improved or get worse over the next 7 days  This includes new onset fever, localized ear pain, sinus pain, as well as worsening cough, chest pain, shortness of breath, or significant weakness/fatigue

## 2022-10-11 NOTE — PROGRESS NOTES
[facility]        NAME: Roxana Cazares is a 59 y o  male  : 1965    MRN: [id number]  DATE: 2022  TIME: 8:55 AM    Assessment and Plan   Viral upper respiratory tract infection [J06 9]  1  Viral upper respiratory tract infection      guaifenesin-codeine (GUAIFENESIN AC) 100-10 MG/5ML liquid   2  Encounter for screening for COVID-19  Covid19 and INFLUENZA A/B PCR     --Advised to avoid taking Rx cough medication same time as Xanax    Patient Instructions     --Rest, drink plenty of fluids  --COVID and flu testing sent  Will call with results  Average turn around time is 24-48 hours  --Advise self-quarantining until you hear back from us regarding test results (at the earliest)  This involves not leaving your house, wearing a mask at all times while outside your immediate living area, and disinfecting all commonly used surfaces and personal items  If your COVID test results are positive, you will need to self-quarantine at home for at least 5 days from the onset of your symptoms, until you are feeling better, and until you are without a fever for at least 24 hours  --For nasal/sinus congestion, helpful measures include steam, warm compresses, an OTC saline nasal spray or Neti pot, or an OTC decongestant (such as Sudafed)  The decongestant should be avoided, however, if you are under 10years of age, or have a history of high blood pressure or heart disease  In addition, an OTC nasal steroid (Flonase, Nasocort) or nasal decongestant (Afrin, [place]-synephrine) may be taken  The nasal steroid should be used at bedtime, after the saline nasal spray  The nasal decongestant should not be taken more than 3 days consecutively in order to prevent rebound congestion  --For nasal drainage, postnasal drip, sneezing and itching, an OTC antihistamine (Allegra, Benadryl, etc) can be taken     --For cough, you can take an OTC expectorant such as plain Robitussion or Mucinex (active ingredient guaifenesin)  A spoonful of honey at bedtime may also be helpful, as may a prescription cough medicine  Also recommended is the use of a cool mist humidifier (with or without Vicks) in the bedroom at night  --For sore throat, you can take OTC lozenges, use warm gargles (salt water or apple cider vinegar and honey), herbal teas, or an OTC throat spray (Chloraseptic)  --You can take Tylenol or Motrin/Advil as needed for fever, headache, body aches  Motrin/Advil should be avoided, however, if you have a history of heart disease, bleeding ulcers, or if you take blood thinners  --You should contact your primary care provider and/or go to the ER if your symptoms are not improved or get worse over the next 7 days  This includes new onset fever, localized ear pain, sinus pain, as well as worsening cough, chest pain, shortness of breath, or significant weakness/fatigue  Chief Complaint     Chief Complaint   Patient presents with   • Cold Like Symptoms     Sx started 2 days ago  Nasal congestion, cough, runny nose, chest discomfort         History of Present Illness       Here with complaints of nasal congestion, clear/yellow rhinorrhea, minimally productive cough, headache x 2 days  No fever, body aches, dyspnea, wheezing  Appetite OK  No N/V/D  No known sick contacts  No home COVID testing  Taking Zyrtec  Also using inhaler for intermittent asthma with good effect  Review of Systems   Review of Systems   Constitutional: Negative for fever  HENT: Positive for rhinorrhea  Negative for ear pain and sore throat  Respiratory: Positive for cough  Gastrointestinal: Negative for abdominal pain, nausea and vomiting  Musculoskeletal: Negative for myalgias  Neurological: Positive for headaches           Current Medications       Current Outpatient Medications:   •  albuterol (PROVENTIL HFA,VENTOLIN HFA) 90 mcg/act inhaler,  , Disp: , Rfl:   •  ALPRAZolam (XANAX) 0 5 mg tablet, TAKE 1 TAB TWICE DAILY AS NEEDED, Disp: , Rfl: 1  •  famotidine (PEPCID) 40 MG tablet, TAKE 1 TABLET BY MOUTH EVERY DAY, Disp: 30 tablet, Rfl: 2  •  fluticasone-salmeterol (ADVAIR HFA) 115-21 MCG/ACT inhaler, Inhale 2 puffs 2 (two) times a day Rinse mouth after use , Disp: , Rfl:   •  guaifenesin-codeine (GUAIFENESIN AC) 100-10 MG/5ML liquid, TAKE 1-2 TSP BY  MOUTH EVERY 4-6 HOURS AS NEEDED FOR COUGH, Disp: 180 mL, Rfl: 0  •  Multiple Vitamin (MULTIVITAMIN) capsule, Take 1 capsule by mouth daily, Disp: , Rfl:   •  sildenafil (VIAGRA) 100 mg tablet, Take 1 tablet (100 mg total) by mouth daily as needed for erectile dysfunction, Disp: 30 tablet, Rfl: 3  •  milk thistle 175 MG tablet, Take 175 mg by mouth daily (Patient not taking: Reported on 10/11/2022), Disp: , Rfl:   •  tadalafil (CIALIS) 20 MG tablet, Take 1 tablet (20 mg total) by mouth daily as needed for erectile dysfunction, Disp: 30 tablet, Rfl: 3    Current Allergies     Allergies as of 10/11/2022   • (No Known Allergies)            The following portions of the patient's history were reviewed and updated as appropriate: allergies, current medications, past family history, past medical history, past social history, past surgical history and problem list      Past Medical History:   Diagnosis Date   • Asthma    • Colon polyp    • COVID-19 11/2021   • GERD (gastroesophageal reflux disease)    • IBS (irritable bowel syndrome)        Past Surgical History:   Procedure Laterality Date   • COLONOSCOPY     • KNEE SURGERY     • [place] COLONOSCOPY FLX DX W/COLLJ SPEC WHEN PFRMD N/A 3/20/2017    Procedure: COLONOSCOPY;  Surgeon: Reynaldo King MD;  Location: [facility]; Service: Gastroenterology   • TONSILLECTOMY     • UPPER GASTROINTESTINAL ENDOSCOPY         Family History   Problem Relation Age of Onset   • Diabetes Father    • Heart disease Father    • Hypertension Father    • Cancer Sister         ovarian         Medications have been verified          Objective   Pulse 81   Temp 98 °F (36 7 °C) (Temporal)   SpO2 98%   No LMP for male patient  Physical Exam     Physical Exam  Constitutional:       General: He is not in acute distress  Appearance: He is well-developed  He is not diaphoretic  HENT:      Head: Normocephalic and atraumatic  Right Ear: Tympanic membrane, ear canal and external ear normal       Left Ear: Tympanic membrane, ear canal and external ear normal       Nose: Congestion and rhinorrhea present  Mouth/Throat:      Pharynx: No oropharyngeal exudate or posterior oropharyngeal erythema  Eyes:      General:         Right eye: No discharge  Left eye: No discharge  Conjunctiva/sclera: Conjunctivae normal    Cardiovascular:      Rate and Rhythm: Normal rate and regular rhythm  Heart sounds: Normal heart sounds  Pulmonary:      Effort: Pulmonary effort is normal  No respiratory distress  Breath sounds: Normal breath sounds  No stridor  No wheezing, rhonchi or rales  Comments: Breathing non-labored  RR=18  No cough noted  Chest:      Chest wall: No tenderness  Abdominal:      Palpations: Abdomen is soft  Tenderness: There is no abdominal tenderness  Musculoskeletal:      Cervical back: Neck supple  No tenderness  Lymphadenopathy:      Cervical: No cervical adenopathy  Skin:     General: Skin is warm and dry  Findings: No rash  Neurological:      Mental Status: He is alert and oriented to person, place, and time     Psychiatric:         Mood and Affect: Mood normal

## 2022-10-12 LAB
FLUAV RNA RESP QL NAA+PROBE: NEGATIVE
FLUBV RNA RESP QL NAA+PROBE: NEGATIVE
SARS-COV-2 RNA RESP QL NAA+PROBE: NEGATIVE

## 2022-12-19 DIAGNOSIS — K21.9 GASTROESOPHAGEAL REFLUX DISEASE WITHOUT ESOPHAGITIS: ICD-10-CM

## 2022-12-19 RX ORDER — FAMOTIDINE 40 MG/1
TABLET, FILM COATED ORAL
Qty: 30 TABLET | Refills: 2 | Status: SHIPPED | OUTPATIENT
Start: 2022-12-19

## 2023-01-02 DIAGNOSIS — K21.9 GASTROESOPHAGEAL REFLUX DISEASE WITHOUT ESOPHAGITIS: ICD-10-CM

## 2023-01-04 RX ORDER — FAMOTIDINE 40 MG/1
TABLET, FILM COATED ORAL
Qty: 90 TABLET | Refills: 1 | Status: SHIPPED | OUTPATIENT
Start: 2023-01-04

## 2023-01-06 ENCOUNTER — OFFICE VISIT (OUTPATIENT)
Dept: URGENT CARE | Facility: CLINIC | Age: 58
End: 2023-01-06

## 2023-01-06 VITALS
HEART RATE: 91 BPM | BODY MASS INDEX: 31.29 KG/M2 | RESPIRATION RATE: 18 BRPM | DIASTOLIC BLOOD PRESSURE: 92 MMHG | WEIGHT: 231 LBS | SYSTOLIC BLOOD PRESSURE: 142 MMHG | TEMPERATURE: 97.1 F | OXYGEN SATURATION: 97 % | HEIGHT: 72 IN

## 2023-01-06 DIAGNOSIS — J06.9 ACUTE URI: Primary | ICD-10-CM

## 2023-01-06 NOTE — PROGRESS NOTES
[facility]      NAME: Xu Tay is a 59 y o  male  : 1965    MRN: [id number]  DATE: 2023  TIME: 2:13 PM    Assessment and Plan   Acute URI [J06 9]  1  Acute URI            Patient Instructions   Robitussin, delsym or Mucinex for cough  Increase fluid intake   Tylenol/Motrin as needed for pain or fever  Rest  Follow up with your PCP for worsening symptoms    Follow up with PCP in 3-5 days  Proceed to  ER if symptoms worsen  Chief Complaint     Chief Complaint   Patient presents with   • Cold Like Symptoms     Pt  C/o runny nose and chest congestion x 5 days, slight cough  Pt  Taking sudafed and flonase  History of Present Illness       Patient is a 53-year-old male presenting with 5 days of rhinorrhea, productive cough, and chest congestion  Denies fever, chills, ear pain, sore throat, or shortness of breath  Reports normal appetite  He started using Flonase and Sudafed 4 days ago  Review of Systems   Review of Systems   Constitutional: Negative for activity change, chills, fatigue and fever  HENT: Positive for rhinorrhea  Negative for congestion, ear pain, sinus pain and sore throat  Respiratory: Positive for cough  Negative for shortness of breath  Skin: Negative for rash  Neurological: Negative for headaches  Current Medications       Current Outpatient Medications:   •  famotidine (PEPCID) 40 MG tablet, TAKE 1 TABLET BY MOUTH EVERY DAY, Disp: 90 tablet, Rfl: 1  •  Multiple Vitamin (MULTIVITAMIN) capsule, Take 1 capsule by mouth daily, Disp: , Rfl:   •  albuterol (PROVENTIL HFA,VENTOLIN HFA) 90 mcg/act inhaler,   (Patient not taking: Reported on 2023), Disp: , Rfl:   •  ALPRAZolam (XANAX) 0 5 mg tablet, TAKE 1 TAB TWICE DAILY AS NEEDED, Disp: , Rfl: 1  •  fluticasone-salmeterol (ADVAIR HFA) 115-21 MCG/ACT inhaler, Inhale 2 puffs 2 (two) times a day Rinse mouth after use   (Patient not taking: Reported on 2023), Disp: , Rfl:   • guaifenesin-codeine (GUAIFENESIN AC) 100-10 MG/5ML liquid, TAKE 1-2 TSP BY  MOUTH EVERY 4-6 HOURS AS NEEDED FOR COUGH (Patient not taking: Reported on 1/6/2023), Disp: 180 mL, Rfl: 0  •  milk thistle 175 MG tablet, Take 175 mg by mouth daily (Patient not taking: Reported on 10/11/2022), Disp: , Rfl:   •  sildenafil (VIAGRA) 100 mg tablet, Take 1 tablet (100 mg total) by mouth daily as needed for erectile dysfunction, Disp: 30 tablet, Rfl: 3  •  tadalafil (CIALIS) 20 MG tablet, Take 1 tablet (20 mg total) by mouth daily as needed for erectile dysfunction, Disp: 30 tablet, Rfl: 3    Current Allergies     Allergies as of 01/06/2023   • (No Known Allergies)            The following portions of the patient's history were reviewed and updated as appropriate: allergies, current medications, past family history, past medical history, past social history, past surgical history and problem list      Past Medical History:   Diagnosis Date   • Asthma    • Colon polyp    • COVID-19 11/2021   • GERD (gastroesophageal reflux disease)    • IBS (irritable bowel syndrome)        Past Surgical History:   Procedure Laterality Date   • COLONOSCOPY     • KNEE SURGERY     • HI COLONOSCOPY FLX DX W/COLLJ SPEC WHEN PFRMD N/A 3/20/2017    Procedure: COLONOSCOPY;  Surgeon: Sherley Mena MD;  Location: [facility]; Service: Gastroenterology   • TONSILLECTOMY     • UPPER GASTROINTESTINAL ENDOSCOPY         Family History   Problem Relation Age of Onset   • Diabetes Father    • Heart disease Father    • Hypertension Father    • Cancer Sister         ovarian         Medications have been verified  Objective   /92   Pulse 91   Temp (!) 97 1 °F (36 2 °C)   Resp 18   Ht 6' (1 829 m)   Wt 105 kg (231 lb)   SpO2 97%   BMI 31 33 kg/m²        Physical Exam     Physical Exam  Vitals reviewed  Constitutional:       General: He is awake  He is not in acute distress  Appearance: Normal appearance  He is normal weight     HENT: Head: Normocephalic  Right Ear: Hearing, tympanic membrane and ear canal normal       Left Ear: Hearing, tympanic membrane and ear canal normal       Nose: Nose normal       Mouth/Throat:      Lips: Pink  Pharynx: Oropharynx is clear  Cardiovascular:      Rate and Rhythm: Normal rate and regular rhythm  Heart sounds: Normal heart sounds, S1 normal and S2 normal    Pulmonary:      Effort: Pulmonary effort is normal       Breath sounds: Normal breath sounds  No decreased breath sounds, wheezing, rhonchi or rales  Skin:     General: Skin is warm and moist    Neurological:      General: No focal deficit present  Mental Status: He is alert and oriented to person, place, and time  Psychiatric:         Behavior: Behavior is cooperative

## 2023-01-06 NOTE — PATIENT INSTRUCTIONS
Robitussin, delsym or Mucinex for cough  Increase fluid intake   Tylenol/Motrin as needed for pain or fever  Rest  Follow up with your PCP for worsening symptoms    Upper Respiratory Infection   WHAT YOU NEED TO KNOW:   An upper respiratory infection is also called a cold  It can affect your nose, throat, ears, and sinuses  Cold symptoms are usually worst for the first 3 to 5 days  Most people get better in 7 to 14 days  You may continue to cough for 2 to 3 weeks  Colds are caused by viruses and do not get better with antibiotics  DISCHARGE INSTRUCTIONS:   Call your local emergency number (911 in the [address] [place] [address],[address]) if:   You have chest pain or trouble breathing  Return to the emergency department if:   You have a fever over 102ºF (39ºC)  Call your doctor if:   You have a low fever  Your sore throat gets worse or you see white or yellow spots in your throat  Your symptoms get worse after 3 to 5 days or are not better in 14 days  You have a rash anywhere on your skin  You have large, tender lumps in your neck  You have thick, green, or yellow drainage from your nose  You cough up thick yellow, green, or bloody mucus  You have a bad earache  You have questions or concerns about your condition or care  Medicines: You may need any of the following:  Decongestants  help reduce nasal congestion and help you breathe more easily  If you take decongestant pills, they may make you feel restless or cause problems with your sleep  Do not use decongestant sprays for more than a few days  Cough suppressants  help reduce coughing  Ask your healthcare provider which type of cough medicine is best for you  NSAIDs , such as ibuprofen, help decrease swelling, pain, and fever  NSAIDs can cause stomach bleeding or kidney problems in certain people  If you take blood thinner medicine, always ask your healthcare provider if NSAIDs are safe for you   Always read the medicine label and follow directions  Acetaminophen  decreases pain and fever  It is available without a doctor's order  Ask how much to take and how often to take it  Follow directions  Read the labels of all other medicines you are using to see if they also contain acetaminophen, or ask your doctor or pharmacist  Acetaminophen can cause liver damage if not taken correctly  Do not use more than 4 grams (4,000 milligrams) total of acetaminophen in one day  Take your medicine as directed  Contact your healthcare provider if you think your medicine is not helping or if you have side effects  Tell him or her if you are allergic to any medicine  Keep a list of the medicines, vitamins, and herbs you take  Include the amounts, and when and why you take them  Bring the list or the pill bottles to follow-up visits  Carry your medicine list with you in case of an emergency  Self-care:   Rest as much as possible  Slowly start to do more each day  Drink more liquids as directed  Liquids will help thin and loosen mucus so you can cough it up  Liquids will also help prevent dehydration  Liquids that help prevent dehydration include water, fruit juice, and broth  Do not drink liquids that contain caffeine  Caffeine can increase your risk for dehydration  Ask your healthcare provider how much liquid to drink each day  Soothe a sore throat  Gargle with warm salt water  Make salt water by dissolving ¼ teaspoon salt in 1 cup warm water  You may also suck on hard candy or throat lozenges  You may use a sore throat spray  Use a humidifier or vaporizer  Use a cool mist humidifier or a vaporizer to increase air moisture in your home  This may make it easier for you to breathe and help decrease your cough  Use saline nasal drops as directed  These help relieve congestion  Apply petroleum-based jelly around the outside of your nostrils  This can decrease irritation from blowing your nose  Do not smoke    Nicotine and other chemicals in cigarettes and cigars can make your symptoms worse  They can also cause infections such as bronchitis or pneumonia  Ask your healthcare provider for information if you currently smoke and need help to quit  E-cigarettes or smokeless tobacco still contain nicotine  Talk to your healthcare provider before you use these products  Prevent a cold: Wash your hands often  Use soap and water every time you wash your hands  Rub your soapy hands together, lacing your fingers  Use the fingers of one hand to scrub under the nails of the other hand  Wash for at least 20 seconds  Rinse with warm, running water for several seconds  Then dry your hands  Use germ-killing gel if soap and water are not available  Do not touch your eyes or mouth without washing your hands first          Cover a sneeze or cough  Use a tissue that covers your mouth and nose  Put the used tissue in the trash right away  Use the bend of your arm if a tissue is not available  Wash your hands well with soap and water or use a hand   Do not stand close to anyone who is sneezing or coughing  Try to stay away from others while you are sick  This is especially important during the first 2 to 3 days when the virus is more easily spread  Wait until a fever, cough, or other symptoms are gone before you return to work or other regular activities  Do not share items while you are sick  This includes food, drinks, eating utensils, and dishes  Follow up with your doctor as directed:  Write down your questions so you remember to ask them during your visits  © [employer] 2022 Information is for End User's use only and may not be sold, redistributed or otherwise used for commercial purposes  All illustrations and images included in CareNotes® are the copyrighted property of [employer]  or [employer]   The above information is an  only   It is not intended as medical advice for individual conditions or treatments  Talk to your doctor, nurse or pharmacist before following any medical regimen to see if it is safe and effective for you

## 2023-12-12 ENCOUNTER — HOSPITAL ENCOUNTER (EMERGENCY)
Facility: HOSPITAL | Age: 58
Discharge: HOME | End: 2023-12-12
Attending: STUDENT IN AN ORGANIZED HEALTH CARE EDUCATION/TRAINING PROGRAM
Payer: COMMERCIAL

## 2023-12-12 VITALS
RESPIRATION RATE: 18 BRPM | BODY MASS INDEX: 31.35 KG/M2 | DIASTOLIC BLOOD PRESSURE: 111 MMHG | HEIGHT: 72 IN | WEIGHT: 231.48 LBS | HEART RATE: 117 BPM | TEMPERATURE: 98.4 F | OXYGEN SATURATION: 95 % | SYSTOLIC BLOOD PRESSURE: 170 MMHG

## 2023-12-12 DIAGNOSIS — F10.90 ALCOHOL USE DISORDER: ICD-10-CM

## 2023-12-12 DIAGNOSIS — F10.929 ALCOHOL INTOXICATION (HCC): Primary | ICD-10-CM

## 2023-12-12 LAB
ALBUMIN SERPL BCP-MCNC: 4.4 G/DL (ref 3.5–5)
ALP SERPL-CCNC: 55 U/L (ref 34–104)
ALT SERPL W P-5'-P-CCNC: 117 U/L (ref 7–52)
AMPHETAMINES SERPL QL SCN: NEGATIVE
ANION GAP SERPL CALCULATED.3IONS-SCNC: 9 MMOL/L
APTT PPP: 30 SECONDS (ref 23–37)
AST SERPL W P-5'-P-CCNC: 98 U/L (ref 13–39)
BARBITURATES UR QL: NEGATIVE
BASOPHILS # BLD AUTO: 0.13 THOUSANDS/ÂΜL (ref 0–0.1)
BASOPHILS NFR BLD AUTO: 2 % (ref 0–1)
BENZODIAZ UR QL: NEGATIVE
BILIRUB SERPL-MCNC: 0.63 MG/DL (ref 0.2–1)
BUN SERPL-MCNC: 10 MG/DL (ref 5–25)
CALCIUM SERPL-MCNC: 8.9 MG/DL (ref 8.4–10.2)
CHLORIDE SERPL-SCNC: 101 MMOL/L (ref 96–108)
CO2 SERPL-SCNC: 30 MMOL/L (ref 21–32)
COCAINE UR QL: NEGATIVE
CREAT SERPL-MCNC: 0.93 MG/DL (ref 0.6–1.3)
EOSINOPHIL # BLD AUTO: 0.23 THOUSAND/ÂΜL (ref 0–0.61)
EOSINOPHIL NFR BLD AUTO: 3 % (ref 0–6)
ERYTHROCYTE [DISTWIDTH] IN BLOOD BY AUTOMATED COUNT: 13.2 % (ref 11.6–15.1)
ETHANOL SERPL-MCNC: 271 MG/DL
GFR SERPL CREATININE-BSD FRML MDRD: 88 ML/MIN/1.73SQ M
GLUCOSE SERPL-MCNC: 115 MG/DL (ref 65–140)
HCT VFR BLD AUTO: 46 % (ref 36.5–49.3)
HGB BLD-MCNC: 16.1 G/DL (ref 12–17)
IMM GRANULOCYTES # BLD AUTO: 0.02 THOUSAND/UL (ref 0–0.2)
IMM GRANULOCYTES NFR BLD AUTO: 0 % (ref 0–2)
INR PPP: 1 (ref 0.84–1.19)
LYMPHOCYTES # BLD AUTO: 2.86 THOUSANDS/ÂΜL (ref 0.6–4.47)
LYMPHOCYTES NFR BLD AUTO: 38 % (ref 14–44)
MAGNESIUM SERPL-MCNC: 2.2 MG/DL (ref 1.9–2.7)
MCH RBC QN AUTO: 32.9 PG (ref 26.8–34.3)
MCHC RBC AUTO-ENTMCNC: 35 G/DL (ref 31.4–37.4)
MCV RBC AUTO: 94 FL (ref 82–98)
METHADONE UR QL: NEGATIVE
MONOCYTES # BLD AUTO: 0.95 THOUSAND/ÂΜL (ref 0.17–1.22)
MONOCYTES NFR BLD AUTO: 13 % (ref 4–12)
NEUTROPHILS # BLD AUTO: 3.3 THOUSANDS/ÂΜL (ref 1.85–7.62)
NEUTS SEG NFR BLD AUTO: 44 % (ref 43–75)
NRBC BLD AUTO-RTO: 0 /100 WBCS
OPIATES UR QL SCN: NEGATIVE
OXYCODONE+OXYMORPHONE UR QL SCN: NEGATIVE
PCP UR QL: NEGATIVE
PLATELET # BLD AUTO: 187 THOUSANDS/UL (ref 149–390)
PMV BLD AUTO: 9.4 FL (ref 8.9–12.7)
POTASSIUM SERPL-SCNC: 3.9 MMOL/L (ref 3.5–5.3)
PROT SERPL-MCNC: 8 G/DL (ref 6.4–8.4)
PROTHROMBIN TIME: 13.8 SECONDS (ref 11.6–14.5)
RBC # BLD AUTO: 4.89 MILLION/UL (ref 3.88–5.62)
SODIUM SERPL-SCNC: 140 MMOL/L (ref 135–147)
THC UR QL: NEGATIVE
WBC # BLD AUTO: 7.49 THOUSAND/UL (ref 4.31–10.16)

## 2023-12-12 PROCEDURE — 82077 ASSAY SPEC XCP UR&BREATH IA: CPT | Performed by: STUDENT IN AN ORGANIZED HEALTH CARE EDUCATION/TRAINING PROGRAM

## 2023-12-12 PROCEDURE — 85730 THROMBOPLASTIN TIME PARTIAL: CPT | Performed by: STUDENT IN AN ORGANIZED HEALTH CARE EDUCATION/TRAINING PROGRAM

## 2023-12-12 PROCEDURE — 80307 DRUG TEST PRSMV CHEM ANLYZR: CPT | Performed by: STUDENT IN AN ORGANIZED HEALTH CARE EDUCATION/TRAINING PROGRAM

## 2023-12-12 PROCEDURE — 85025 COMPLETE CBC W/AUTO DIFF WBC: CPT | Performed by: STUDENT IN AN ORGANIZED HEALTH CARE EDUCATION/TRAINING PROGRAM

## 2023-12-12 PROCEDURE — 83735 ASSAY OF MAGNESIUM: CPT | Performed by: STUDENT IN AN ORGANIZED HEALTH CARE EDUCATION/TRAINING PROGRAM

## 2023-12-12 PROCEDURE — 93005 ELECTROCARDIOGRAM TRACING: CPT

## 2023-12-12 PROCEDURE — 85610 PROTHROMBIN TIME: CPT | Performed by: STUDENT IN AN ORGANIZED HEALTH CARE EDUCATION/TRAINING PROGRAM

## 2023-12-12 PROCEDURE — 80053 COMPREHEN METABOLIC PANEL: CPT | Performed by: STUDENT IN AN ORGANIZED HEALTH CARE EDUCATION/TRAINING PROGRAM

## 2023-12-12 PROCEDURE — 36415 COLL VENOUS BLD VENIPUNCTURE: CPT | Performed by: STUDENT IN AN ORGANIZED HEALTH CARE EDUCATION/TRAINING PROGRAM

## 2023-12-12 PROCEDURE — 99284 EMERGENCY DEPT VISIT MOD MDM: CPT

## 2023-12-12 RX ORDER — LANOLIN ALCOHOL/MO/W.PET/CERES
100 CREAM (GRAM) TOPICAL ONCE
Status: COMPLETED | OUTPATIENT
Start: 2023-12-12 | End: 2023-12-12

## 2023-12-12 RX ORDER — LORAZEPAM 1 MG/1
2 TABLET ORAL ONCE
Status: COMPLETED | OUTPATIENT
Start: 2023-12-12 | End: 2023-12-12

## 2023-12-12 RX ORDER — FOLIC ACID 1 MG/1
1 TABLET ORAL ONCE
Status: COMPLETED | OUTPATIENT
Start: 2023-12-12 | End: 2023-12-12

## 2023-12-12 RX ADMIN — LORAZEPAM 2 MG: 1 TABLET ORAL at 15:15

## 2023-12-12 RX ADMIN — LORAZEPAM 2 MG: 1 TABLET ORAL at 17:37

## 2023-12-12 RX ADMIN — Medication 100 MG: at 15:16

## 2023-12-12 RX ADMIN — MULTIPLE VITAMINS W/ MINERALS TAB 1 TABLET: TAB ORAL at 15:16

## 2023-12-12 RX ADMIN — FOLIC ACID 1 MG: 1 TABLET ORAL at 15:16

## 2023-12-12 NOTE — ED PROVIDER NOTES
History  Chief Complaint   Patient presents with    Alcohol Intoxication     Bottle of scotch daily, wants help getting sober     58-year-old male with past medical history significant for asthma, IBS presents to the ED for evaluation of alcohol use.  States that he is a longstanding drinker of at least 40 years.  Drinks about 1 bottle of scotch every day.  Is here with his friend who provides some history as well.  Over the past few days, has been binge drinking 2 bottles of scotch every day.  His friend states that this morning the patient called him stating that he needed help for his alcohol use and so patient was brought to the ED for evaluation.  Patient himself has no complaints.  Denies any suicidal ideation, homicidal ideation, hallucinations.  Last drink of alcohol was at 730 this morning.  Denies coingestants        Prior to Admission Medications   Prescriptions Last Dose Informant Patient Reported? Taking?   ALPRAZolam (XANAX) 0.5 mg tablet  Self Yes No   Sig: TAKE 1 TAB TWICE DAILY AS NEEDED   Multiple Vitamin (MULTIVITAMIN) capsule  Self Yes No   Sig: Take 1 capsule by mouth daily   albuterol (PROVENTIL HFA,VENTOLIN HFA) 90 mcg/act inhaler  Self Yes No   Sig:     Patient not taking: Reported on 1/6/2023   famotidine (PEPCID) 40 MG tablet   No No   Sig: TAKE 1 TABLET BY MOUTH EVERY DAY   fluticasone-salmeterol (ADVAIR HFA) 115-21 MCG/ACT inhaler  Self Yes No   Sig: Inhale 2 puffs 2 (two) times a day Rinse mouth after use.   Patient not taking: Reported on 1/6/2023   guaifenesin-codeine (GUAIFENESIN AC) 100-10 MG/5ML liquid   No No   Sig: TAKE 1-2 TSP BY  MOUTH EVERY 4-6 HOURS AS NEEDED FOR COUGH   Patient not taking: Reported on 1/6/2023   milk thistle 175 MG tablet  Self Yes No   Sig: Take 175 mg by mouth daily   Patient not taking: Reported on 10/11/2022   sildenafil (VIAGRA) 100 mg tablet   No No   Sig: Take 1 tablet (100 mg total) by mouth daily as needed for erectile dysfunction   tadalafil  (CIALIS) 20 MG tablet   No No   Sig: Take 1 tablet (20 mg total) by mouth daily as needed for erectile dysfunction      Facility-Administered Medications: None       Past Medical History:   Diagnosis Date    Asthma     Colon polyp     COVID-19 2021    GERD (gastroesophageal reflux disease)     IBS (irritable bowel syndrome)        Past Surgical History:   Procedure Laterality Date    COLONOSCOPY      KNEE SURGERY      IN COLONOSCOPY FLX DX W/COLLJ SPEC WHEN PFRMD N/A 3/20/2017    Procedure: COLONOSCOPY;  Surgeon: Maycol Weathers MD;  Location: [facility];  Service: Gastroenterology    TONSILLECTOMY      UPPER GASTROINTESTINAL ENDOSCOPY         Family History   Problem Relation Age of Onset    Diabetes Father     Heart disease Father     Hypertension Father     Cancer Sister         ovarian     I have reviewed and agree with the history as documented.    E-Cigarette/Vaping    E-Cigarette Use Never User      E-Cigarette/Vaping Substances     Social History     Tobacco Use    Smoking status: Former     Packs/day: 1.00     Years: 30.00     Total pack years: 30.00     Types: Cigarettes     Quit date: 10/2016     Years since quittin.2    Smokeless tobacco: Never   Vaping Use    Vaping Use: Never used   Substance Use Topics    Alcohol use: Yes     Alcohol/week: 2.0 standard drinks of alcohol     Types: 2 Cans of beer per week     Comment: daily scotch    Drug use: No       Review of Systems   Constitutional:  Negative for chills and fever.   HENT:  Negative for ear pain and sore throat.    Eyes:  Negative for pain and visual disturbance.   Respiratory:  Negative for cough and shortness of breath.    Cardiovascular:  Negative for chest pain and palpitations.   Gastrointestinal:  Negative for abdominal pain and vomiting.   Genitourinary:  Negative for dysuria and hematuria.   Musculoskeletal:  Negative for arthralgias and back pain.   Skin:  Negative for color change and rash.   Neurological:  Negative for seizures  and syncope.   All other systems reviewed and are negative.      Physical Exam  Physical Exam  Vitals and nursing note reviewed.   Constitutional:       General: He is not in acute distress.     Appearance: He is well-developed.   HENT:      Head: Normocephalic and atraumatic.   Eyes:      Conjunctiva/sclera: Conjunctivae normal.   Cardiovascular:      Rate and Rhythm: Normal rate and regular rhythm.      Heart sounds: No murmur heard.  Pulmonary:      Effort: Pulmonary effort is normal. No respiratory distress.      Breath sounds: Normal breath sounds.   Abdominal:      Palpations: Abdomen is soft.      Tenderness: There is no abdominal tenderness.   Musculoskeletal:         General: No swelling.      Cervical back: Neck supple.   Skin:     General: Skin is warm and dry.      Capillary Refill: Capillary refill takes less than 2 seconds.   Neurological:      General: No focal deficit present.      Mental Status: He is alert and oriented to person, place, and time.      Cranial Nerves: No cranial nerve deficit.      Sensory: No sensory deficit.      Motor: No weakness.      Comments: No muscle fasciculations   Psychiatric:         Mood and Affect: Mood normal.         Vital Signs  ED Triage Vitals   Temperature Pulse Respirations Blood Pressure SpO2   12/12/23 1012 12/12/23 0953 12/12/23 0953 12/12/23 0953 12/12/23 0953   98.4 °F (36.9 °C) 86 18 142/95 97 %      Temp Source Heart Rate Source Patient Position - Orthostatic VS BP Location FiO2 (%)   12/12/23 1012 12/12/23 1100 12/12/23 0953 12/12/23 0953 --   Oral Monitor Sitting Right arm       Pain Score       12/12/23 1517       No Pain           Vitals:    12/12/23 1438 12/12/23 1517 12/12/23 1715 12/12/23 1716   BP: (!) 140/106 (!) 146/107 (!) 170/111 (!) 170/111   Pulse: 105 (!) 112 (!) 117 (!) 117   Patient Position - Orthostatic VS:  Sitting Sitting          Visual Acuity      ED Medications  Medications   multivitamin-minerals (CENTRUM) tablet 1 tablet (1  tablet Oral Given 12/12/23 1516)   thiamine tablet 100 mg (100 mg Oral Given 12/12/23 1516)   folic acid (FOLVITE) tablet 1 mg (1 mg Oral Given 12/12/23 1516)   LORazepam (ATIVAN) tablet 2 mg (2 mg Oral Given 12/12/23 1515)   LORazepam (ATIVAN) tablet 2 mg (2 mg Oral Given 12/12/23 1737)       Diagnostic Studies  Results Reviewed       Procedure Component Value Units Date/Time    CMP [[id number]]  (Abnormal) Collected: 12/12/23 1055    Lab Status: Final result Specimen: Blood from Arm, Right Updated: 12/12/23 1125     Sodium 140 mmol/L      Potassium 3.9 mmol/L      Chloride 101 mmol/L      CO2 30 mmol/L      ANION GAP 9 mmol/L      BUN 10 mg/dL      Creatinine 0.93 mg/dL      Glucose 115 mg/dL      Calcium 8.9 mg/dL      AST 98 U/L       U/L      Alkaline Phosphatase 55 U/L      Total Protein 8.0 g/dL      Albumin 4.4 g/dL      Total Bilirubin 0.63 mg/dL      eGFR 88 ml/min/1.73sq m     Narrative:      National Kid[employer] Foundation guidelines for Chronic Kidney Disease (CKD):     Stage 1 with normal or high GFR (GFR > 90 mL/min/1.73 square meters)    Stage 2 Mild CKD (GFR = 60-89 mL/min/1.73 square meters)    Stage 3A Moderate CKD (GFR = 45-59 mL/min/1.73 square meters)    Stage 3B Moderate CKD (GFR = 30-44 mL/min/1.73 square meters)    Stage 4 Severe CKD (GFR = 15-29 mL/min/1.73 square meters)    Stage 5 End Stage CKD (GFR <15 mL/min/1.73 square meters)  Note: GFR calculation is accurate only with a steady state creatinine    Urine drug screen [[id number]]  (Normal) Collected: 12/12/23 1050    Lab Status: Final result Specimen: Urine, Clean Catch Updated: 12/12/23 1125     Amph/Meth UR Negative     Barbiturate Ur Negative     Benzodiazepine Urine Negative     Cocaine Urine Negative     Methadone Urine Negative     Opiate Urine Negative     PCP Ur Negative     THC Urine Negative     Oxycodone Urine Negative    Narrative:      FOR MEDICAL PURPOSES ONLY.   IF CONFIRMATION NEEDED PLEASE CONTACT THE LAB  WITHIN 5 DAYS.    Drug Screen Cutoff Levels:  AMPHETAMINE/METHAMPHETAMINES  1000 ng/mL  BARBITURATES     200 ng/mL  BENZODIAZEPINES     200 ng/mL  COCAINE      300 ng/mL  METHADONE      300 ng/mL  OPIATES      300 ng/mL  PHENCYCLIDINE     25 ng/mL  THC       50 ng/mL  OXYCODONE      100 ng/mL    Magnesium [[id number]]  (Normal) Collected: 12/12/23 1055    Lab Status: Final result Specimen: Blood from Arm, Right Updated: 12/12/23 1125     Magnesium 2.2 mg/dL     Serum ethanol (medical alcohol) [[id number]]  (Abnormal) Collected: 12/12/23 1055    Lab Status: Final result Specimen: Blood from Arm, Right Updated: 12/12/23 1124     Ethanol Lvl 271 mg/dL     Protime-INR [[id number]]  (Normal) Collected: 12/12/23 1055    Lab Status: Final result Specimen: Blood from Arm, Right Updated: 12/12/23 1122     Protime 13.8 seconds      INR 1.00    APTT [[id number]]  (Normal) Collected: 12/12/23 1055    Lab Status: Final result Specimen: Blood from Arm, Right Updated: 12/12/23 1122     PTT 30 seconds     CBC [[id number]]  (Abnormal) Collected: 12/12/23 1055    Lab Status: Final result Specimen: Blood from Arm, Right Updated: 12/12/23 1111     WBC 7.49 Thousand/uL      RBC 4.89 Million/uL      Hemoglobin 16.1 g/dL      Hematocrit 46.0 %      MCV 94 fL      MCH 32.9 pg      MCHC 35.0 g/dL      RDW 13.2 %      MPV 9.4 fL      Platelets 187 Thousands/uL      nRBC 0 /100 WBCs      Neutrophils Relative 44 %      Immat GRANS % 0 %      Lymphocytes Relative 38 %      Monocytes Relative 13 %      Eosinophils Relative 3 %      Basophils Relative 2 %      Neutrophils Absolute 3.30 Thousands/µL      Immature Grans Absolute 0.02 Thousand/uL      Lymphocytes Absolute 2.86 Thousands/µL      Monocytes Absolute 0.95 Thousand/µL      Eosinophils Absolute 0.23 Thousand/µL      Basophils Absolute 0.13 Thousands/µL                    No orders to display              Procedures  Procedures         ED Course  ED Course as of 12/12/23 1741   Tue Dec 12, 2023    1021 ECG 12 lead  EKG performed at 0959: Sinus rhythm with ventricular rate of 84, normal axis, , , QTc 449, no ST segment or T wave changes concerning for acute ischemia/infarct.  Interpreted by me as normal sinus rhythm   1115 CBC(!)  No leukocytosis, stable hemoglobin   1126 CMP(!)  No acute electrolyte abnormalities, normal renal function, elevated LFTs AST/ALT   1127 Urine drug screen  Negative   1127 Protime-INR  INR 1.0   1127 Serum ethanol (medical alcohol)(!)  Elevated   1127 Magnesium  Normal   1130 Patient is medically cleared for transfer to detox facility as per [facility]   1227 [facility] is making referrals   1446 Informed by nursing that patient CIWA is up to 8.  Will order oral lorazepam.  Will order thiamine/folate/multivitamin as well.   1454 With chart review, noted this update from [facility]: patient has been accepted to [place] with anticipated transfer at 1630 today   1729 Repeat CIWA found to be 8.  Will premedicate with oral lorazepam   1741 Transportation arrived.  Patient discharged into their care for transport to facility.  Stable at the time of discharge                               SBIRT 22yo+      Flowsheet Row Most Recent Value   Initial Alcohol Screen: US AUDIT-C     1. How often do you have a drink containing alcohol? 6 Filed at: 12/12/2023 0955   2. How many drinks containing alcohol do you have on a typical day you are drinking?  6 Filed at: 12/12/2023 0955   3a. Male UNDER 65: How often do you have five or more drinks on one occasion? 6 Filed at: 12/12/2023 0955   Audit-C Score 18 Filed at: 12/12/2023 0955   Full Alcohol Screen: US AUDIT    4. How often during the last year have you found that you were not able to stop drinking once you had started? 4 Filed at: 12/12/2023 0955   5. How often during past year have you failed to do what was normally expected of you because of drinking?  4 Filed at: 12/12/2023 0955   6. How often in past year have you needed a first drink in  the morning to get yourself going after a heavy drinking session?  4 Filed at: 12/12/2023 0955   7. How often in past year have you had feeling of guilt or remorse after drinking?  3 Filed at: 12/12/2023 0955   8. How often in past year have you been unable to remember what happened night before because you had been drinking?  3 Filed at: 12/12/2023 0955   9. Have you or someone else been injured as a result of your drinking?  0 Filed at: 12/12/2023 0955   10. Has a relative, friend, doctor or other health worker been concerned about your drinking and suggested you cut down?  4 Filed at: 12/12/2023 0955   AUDIT Total Score 40 Filed at: 12/12/2023 0955   BREA: How many times in the past year have you...    Used an illegal drug or used a prescription medication for non-medical reasons? Never Filed at: 12/12/2023 0955                      Medical Decision Making  58-year-old male presents to the ED for evaluation of alcohol use.  Differential includes but not limited to alcohol use disorder, alcohol intoxication, alcohol withdrawal.  Patient had already been seen by [facility] prior to my evaluation, they will begin searching for treatment facilities pending our medical clearance.  Please see ED course for the remainder of my MDM    Amount and/or Complexity of Data Reviewed  Labs: ordered. Decision-making details documented in ED Course.  ECG/medicine tests:  Decision-making details documented in ED Course.    Risk  OTC drugs.  Prescription drug management.             Disposition  Final diagnoses:   Alcohol intoxication (HCC)   Alcohol use disorder     Time reflects when diagnosis was documented in both MDM as applicable and the Disposition within this note       Time User Action Codes Description Comment    12/12/2023 11:15 AM Stepan Tomlinson Add [F10.929] Alcohol intoxication (HCC)     12/12/2023  4:49 PM Stepan Tomlinson [F10.90] Alcohol use disorder           ED Disposition       ED Disposition   Discharge    Condition    Stable    Date/Time   Tue Dec 12, 2023 1649    Comment   Vernon Barrios should be transferred out to [place] and has been medically cleared.               Follow-up Information       Follow up With Specialties Details Why Contact Info Additional Information    Sergio Bill DO Family Medicine Schedule an appointment as soon as possible for a visit   [address]  [address]  [place] [address] [phone]       [facility] Emergency Department Emergency Medicine Go to  As needed, If symptoms worsen [address] [address]  [phone] [facility] Emergency Department, [address], [place], [place], [address]    [place]  Go today Transfer to [place]              Discharge Medication List as of 12/12/2023  5:11 PM        CONTINUE these medications which have NOT CHANGED    Details   albuterol (PROVENTIL HFA,VENTOLIN HFA) 90 mcg/act inhaler  , Starting Sun 9/26/2021, Historical Med      ALPRAZolam (XANAX) 0.5 mg tablet TAKE 1 TAB TWICE DAILY AS NEEDED, Historical Med      famotidine (PEPCID) 40 MG tablet TAKE 1 TABLET BY MOUTH EVERY DAY, Normal      fluticasone-salmeterol (ADVAIR HFA) 115-21 MCG/ACT inhaler Inhale 2 puffs 2 (two) times a day Rinse mouth after use., Historical Med      guaifenesin-codeine (GUAIFENESIN AC) 100-10 MG/5ML liquid TAKE 1-2 TSP BY  MOUTH EVERY 4-6 HOURS AS NEEDED FOR COUGH, Normal      milk thistle 175 MG tablet Take 175 mg by mouth daily, Historical Med      Multiple Vitamin (MULTIVITAMIN) capsule Take 1 capsule by mouth daily, Historical Med      sildenafil (VIAGRA) 100 mg tablet Take 1 tablet (100 mg total) by mouth daily as needed for erectile dysfunction, Starting Tue 4/12/2022, Normal      tadalafil (CIALIS) 20 MG tablet Take 1 tablet (20 mg total) by mouth daily as needed for erectile dysfunction, Starting Tue 4/12/2022, Normal             No discharge procedures on file.    PDMP Review          Value Time User    PDMP Reviewed  Yes 10/11/2022  8:50 AM CLAUDIO Wood            ED Provider  Electronically Signed by             Stepan Tomlinson DO  12/12/23 1515

## 2023-12-17 LAB
ATRIAL RATE: 84 BPM
P AXIS: 56 DEGREES
PR INTERVAL: 158 MS
QRS AXIS: -7 DEGREES
QRSD INTERVAL: 108 MS
QT INTERVAL: 380 MS
QTC INTERVAL: 449 MS
T WAVE AXIS: 30 DEGREES
VENTRICULAR RATE: 84 BPM

## 2024-03-19 DIAGNOSIS — N52.8 OTHER MALE ERECTILE DYSFUNCTION: ICD-10-CM

## 2024-03-20 RX ORDER — TADALAFIL 20 MG/1
20 TABLET ORAL DAILY PRN
Qty: 30 TABLET | Refills: 3 | Status: SHIPPED | OUTPATIENT
Start: 2024-03-20

## 2024-03-20 RX ORDER — SILDENAFIL 100 MG/1
100 TABLET, FILM COATED ORAL DAILY PRN
Qty: 30 TABLET | Refills: 3 | Status: SHIPPED | OUTPATIENT
Start: 2024-03-20

## 2024-03-27 DIAGNOSIS — K21.9 GASTROESOPHAGEAL REFLUX DISEASE WITHOUT ESOPHAGITIS: ICD-10-CM

## 2024-03-27 RX ORDER — FAMOTIDINE 40 MG/1
TABLET, FILM COATED ORAL
Qty: 30 TABLET | Refills: 2 | Status: SHIPPED | OUTPATIENT
Start: 2024-03-27

## 2024-03-27 NOTE — TELEPHONE ENCOUNTER
Please call patient for non-urgent office visit for further medication refills as they have not been seen in 1 year. I will send short refill for now. Thank you!

## 2024-04-19 DIAGNOSIS — K21.9 GASTROESOPHAGEAL REFLUX DISEASE WITHOUT ESOPHAGITIS: ICD-10-CM

## 2024-04-19 RX ORDER — FAMOTIDINE 40 MG/1
TABLET, FILM COATED ORAL
Qty: 90 TABLET | Refills: 1 | Status: SHIPPED | OUTPATIENT
Start: 2024-04-19

## 2024-05-08 NOTE — TELEPHONE ENCOUNTER
[facility] Medicine  Progress Note    Patient Name: Pili Teixeira  MRN: [id number]  Patient Class: IP- Inpatient   Admission Date: 5/6/2024  Length of Stay: 0 days  Attending Physician: Kashif Jose MD  Primary Care Provider: LUÍS Huggins MD        Subjective:     Principal Problem:Chest pain        HPI:  Pili Teixeira is a 75 year old female with a  previous medical history of cervical spine stenosis and long-term use of opioid pain medication, COPD, CAD, GERD, HTN, HLD, TIA on plavix Monday, Wednesday, Friday, and DMII who presents for chest pain starting today. Patient reports she awoke this morning feeling generally unwell and noted she had a heaviness in the left side of her chest that radiated to her left shoulder. She reports that there were no other adverse symptoms such as nausea, vomiting, shortness of breath, or diaphoresis  that occurred at the same time nor did anything aggravate or alleviate the chest heaviness. Patient reports eating light breakfast and then the pain resolved after some time. She reports that during her physical therapy appointment for her chronic neck pain and the left sided chest heaviness returned and again radiated to her left shoulder. The physical therapist took her blood pressure and noted the systolic to be 175 and advised her to present to the emergency room. Initial ED workup showed a negative troponin, normal chest xray, stable anemia on CBC and unremarkable CMP. EKG was normal sinus rhythm. Patient to be admitted by hospital medicine for further evaluation and management.     Overview/Hospital Course:  Patient was initially admitted to [facility] due to chest pain rule out ACS.  Troponin was elevated, peaked at 1.567 and then downtrended.  Cardiology was consulted and recommended transfer to [facility] for angiogram.  Patient started on therapeutic Lovenox.  Patient has allergy to contrast dye and will require premedication.  Patient is to started  Pt managed by Yamil Kwong, pt requesting refill Sildenafil [facility] [place] pt made yearly appointment 4/6/20 premedicating tonight and plan for angiogram tomorrow morning.     Interval History: Patient seen and examined.  NAD.  Complains of intermittent chest pain/tightness.  Plan for angiogram tomorrow morning.     Review of Systems   Constitutional:  Positive for fatigue.   Respiratory:  Positive for chest tightness and shortness of breath.    Cardiovascular:  Positive for chest pain. Negative for palpitations.   Gastrointestinal:  Negative for abdominal distention, abdominal pain, nausea and vomiting.     Objective:     Vital Signs (Most Recent):  Temp: 97.6 °F (36.4 °C) (05/08/24 1543)  Pulse: 64 (05/08/24 1543)  Resp: 18 (05/08/24 1543)  BP: 117/68 (88) (05/08/24 1543)  SpO2: 97 % (05/08/24 1543) Vital Signs (24h Range):  Temp:  [97.5 °F (36.4 °C)-99 °F (37.2 °C)] 97.6 °F (36.4 °C)  Pulse:  [55-72] 64  Resp:  [15-18] 18  SpO2:  [94 %-100 %] 97 %  BP: (117-162)/(66-77) 117/68     Weight: 62.6 kg (138 lb)  Body mass index is 24.45 kg/m².    Intake/Output Summary (Last 24 hours) at 5/8/2024 1549  Last data filed at 5/8/2024 0142  Gross per 24 hour   Intake 360 ml   Output --   Net 360 ml         Physical Exam  Vitals and nursing note reviewed.   Constitutional:       General: She is not in acute distress.     Appearance: Normal appearance.   HENT:      Head: Normocephalic.      Mouth/Throat:      Mouth: Mucous membranes are dry.   Cardiovascular:      Rate and Rhythm: Normal rate and regular rhythm.      Heart sounds: Normal heart sounds.   Pulmonary:      Effort: Pulmonary effort is normal. No respiratory distress.      Breath sounds: Normal breath sounds.   Abdominal:      General: Bowel sounds are normal. There is no distension.      Palpations: Abdomen is soft.      Tenderness: There is no abdominal tenderness.   Musculoskeletal:         General: Normal range of motion.   Skin:     General: Skin is warm and dry.   Neurological:      General: No focal deficit present.      Mental Status: She is alert and oriented to  person, place, and time.   Psychiatric:         Mood and Affect: Mood normal.             Significant Labs: All pertinent labs within the past 24 hours have been reviewed.  Recent Lab Results  (Last 5 results in the past 24 hours)        05/08/24  1130   05/08/24  0825   05/08/24  0520   05/07/24 2129   05/07/24  1935        Albumin     3.7   3.7         ALP     55   60         ALT     7   8         Anion Gap     5   6         AST     16   14         Baso #     0.06           Basophil %     0.9           BILIRUBIN TOTAL     0.3  Comment: For infants and newborns, interpretation of results should be based  on gestational age, weight and in agreement with clinical  observations.    Premature Infant recommended reference ranges:  Up to 24 hours.............<8.0 mg/dL  Up to 48 hours............<12.0 mg/dL  3-5 days..................<15.0 mg/dL  6-29 days.................<15.0 mg/dL     0.3  Comment: For infants and newborns, interpretation of results should be based  on gestational age, weight and in agreement with clinical  observations.    Premature Infant recommended reference ranges:  Up to 24 hours.............<8.0 mg/dL  Up to 48 hours............<12.0 mg/dL  3-5 days..................<15.0 mg/dL  6-29 days.................<15.0 mg/dL           BUN     24   25         Calcium     9.2   9.4         Chloride     106   102         CO2     26   29         Creatinine     0.8   1.1         Differential Method     Automated           eGFR     >60.0   51.8         Eos #     0.1           Eos %     1.6           Glucose     99   112         Gran # (ANC)     3.3           Gran %     48.7           Hematocrit     37.5           Hemoglobin     11.6           Immature Grans (Abs)     0.03  Comment: Mild elevation in immature granulocytes is non specific and   can be seen in a variety of conditions including stress response,   acute inflammation, trauma and pregnancy. Correlation with other   laboratory and clinical findings  is essential.             Immature Granulocytes     0.4           Lymph #     2.6           Lymph %     38.6           Magnesium      2.3   1.7         MCH     28.7           MCHC     30.9           MCV     93           Mono #     0.7           Mono %     9.8           MPV     11.3           nRBC     0           Platelet Count     158           POC Glucose 85   86       126       Potassium     5.2   4.1         PROTEIN TOTAL     6.0   6.1         RBC     4.04           RDW     12.7           Sodium     137   137         WBC     6.73                                  Significant Imaging: I have reviewed all pertinent imaging results/findings within the past 24 hours.    Assessment/Plan:      * Chest pain  Cardiology consulted  Troponin peaked at 1.567 then trended down  On therapeutic Lovenox.    Plan for angiogram 5/9      Coronary artery disease involving native coronary artery of native heart without angina pectoris  Patient with known CAD which is controlled Will continue ASA, Plavix, and Statin and monitor for S/Sx of angina/ACS. Continue to monitor on telemetry.     Chronic pain, neck, back, leg on home narcotics  Chronic condition noted. Stable    -Continued oxycodone 10-325mg four times daily PRN pain, robaxin, and lidocaine patch      Type 2 diabetes mellitus, without long-term current use of insulin  Patient's FSGs are controlled on current medication regimen.  Last A1c reviewed-   Lab Results   Component Value Date    HGBA1C 5.6 04/18/2024     Most recent fingerstick glucose reviewed-   Recent Labs   Lab 05/07/24  1626   POCTGLUCOSE 107       Current correctional scale  Low  Maintain anti-hyperglycemic dose as follows-   Antihyperglycemics (From admission, onward)      Start     Stop Route Frequency Ordered    05/06/24 1657  insulin aspart U-100 pen 0-5 Units         -- SubQ Before meals & nightly PRN 05/06/24 1603          Hold Oral hypoglycemics while patient is in the hospital.       Hypertension  associated with diabetes  Chronic, uncontrolled. Latest blood pressure and vitals reviewed-     Temp:  [97.5 °F (36.4 °C)-99 °F (37.2 °C)]   Pulse:  [55-72]   Resp:  [15-18]   BP: (117-162)/(66-77)   SpO2:  [94 %-100 %] .   Home meds for hypertension were reviewed and noted below.   Hypertension Medications               valsartan (DIOVAN) 40 MG tablet Take 1 tablet (40 mg total) by mouth once daily.            While in the hospital, will manage blood pressure as follows; Continue home antihypertensive regimen    Will utilize p.r.n. blood pressure medication only if patient's blood pressure greater than 180/110 and she develops symptoms such as worsening chest pain or shortness of breath.       COPD (chronic obstructive pulmonary disease)  Patient's COPD is controlled currently.  Patient is currently off COPD Pathway. Continue scheduled inhalers prn Supplemental oxygen prn  and monitor respiratory status closely.     GERD (gastroesophageal reflux disease)  Chronic condition noted.    -Pantoprazole 40mg PO daily        VTE Risk Mitigation (From admission, onward)           Ordered     enoxaparin injection 60 mg  Every 12 hours         05/06/24 1922     Reason for No Pharmacological VTE Prophylaxis  Once        Question:  Reasons:  Answer:  Already adequately anticoagulated on oral Anticoagulants    05/06/24 1603     IP VTE HIGH RISK PATIENT  Once         05/06/24 1603     Place sequential compression device  Until discontinued         05/06/24 1603                    Discharge Planning   MONSTER: 5/9/2024     Code Status: Full Code   Is the patient medically ready for discharge?:     Reason for patient still in hospital (select all that apply): Patient trending condition and Consult recommendations  Discharge Plan A: Home                  KEZIA Max  Department of Hospital Medicine   [facility]

## 2024-08-12 ENCOUNTER — OFFICE VISIT (OUTPATIENT)
Dept: GASTROENTEROLOGY | Facility: AMBULARY SURGERY CENTER | Age: 59
End: 2024-08-12
Payer: COMMERCIAL

## 2024-08-12 ENCOUNTER — HOSPITAL ENCOUNTER (OUTPATIENT)
Dept: RADIOLOGY | Facility: HOSPITAL | Age: 59
Discharge: HOME | End: 2024-08-12
Payer: COMMERCIAL

## 2024-08-12 VITALS
OXYGEN SATURATION: 97 % | BODY MASS INDEX: 32.15 KG/M2 | SYSTOLIC BLOOD PRESSURE: 138 MMHG | HEART RATE: 72 BPM | DIASTOLIC BLOOD PRESSURE: 78 MMHG | HEIGHT: 72 IN | WEIGHT: 237.4 LBS

## 2024-08-12 DIAGNOSIS — K59.00 CONSTIPATION, UNSPECIFIED CONSTIPATION TYPE: ICD-10-CM

## 2024-08-12 DIAGNOSIS — Z86.010 HISTORY OF ADENOMATOUS POLYP OF COLON: ICD-10-CM

## 2024-08-12 DIAGNOSIS — K21.9 GASTROESOPHAGEAL REFLUX DISEASE WITHOUT ESOPHAGITIS: Primary | ICD-10-CM

## 2024-08-12 PROBLEM — Z86.0101 HISTORY OF ADENOMATOUS POLYP OF COLON: Status: ACTIVE | Noted: 2021-11-03

## 2024-08-12 PROBLEM — K59.09 OTHER CONSTIPATION: Status: ACTIVE | Noted: 2024-08-12

## 2024-08-12 PROCEDURE — 74018 RADEX ABDOMEN 1 VIEW: CPT

## 2024-08-12 PROCEDURE — 99213 OFFICE O/P EST LOW 20 MIN: CPT | Performed by: PHYSICIAN ASSISTANT

## 2024-08-12 NOTE — PROGRESS NOTES
[facility] - Outpatient Progress Note  Vernon Barrios 59 y.o. male MRN: [id number]  Encounter: [id number]    Assessment and Plan    1. Gastroesophageal reflux disease without esophagitis  - Patient is currently stable on famotidine 40 mg PRN.   No current daytime or night time break through symptoms.  No pain or difficulty swallowing.  No unintentional weight loss.  Also recommend:  - avoid NSAIDS  - avoid eating within 3 hours of sleeping  - elevated head of bed 4-6 inches while sleeping  - avoid trigger foods  - recommend daily supply of calcium and vitamin D      2. History of adenomatous polyp of colon  -Last colonoscopy March 2022 with adequate bowel prep and 3 polyps removed.  A mix of tubular adenoma and lymphoid  -Next recall colonoscopy March 2027    3.   Constipation  - will try psyllium husk  - XRAY abdomen      --------------------------------------------------------------------------------------------------------------------    Chief Complaint: Follow-up GERD and history of colon polyps    HPI: Vernon Barrios is a 59 y.o. male new to me with past medical history of BPH, obstructive sleep apnea, mild intermittent asthma, GERD, history of adenomatous colon polyps who presents today for follow up for GERD.  He remains stable on famotidine 40 mg PRN.    Sometimes has sense of needed to go to  but does not.   May have to increase dietary fiber.   Happens 4-5 times per week.  Sense of incompletion at times.   Some straining.  No bleeding    Patient denies any nausea, vomiting, abdominal pain, dysphagia, unexpected weight loss, diarrhea, no blood in stool, or black tarry stools.     Endoscopy History:  EGD -March 2022 with gastritis noted.  Gastric biopsy negative for H. pylori and duodenal biopsy negative for celiac disease  Colonoscopy - March 2022 with adequate bowel prep and 3 polyps removed.  A mix of tubular adenoma and lymphoid    Review of Systems:   General: negative  for fatigue, fever, night sweats or unexpected weight loss  Psychological: negative for anxiety or depression  Ophthalmic: negative for blurry vision or scleral icterus  ENT: negative for headaches, sore throat or dysphagia  Hematological and Lymphatic: negative for pallor or swollen lymph nodes  Respiratory: negative for cough, shortness of breath or wheezing  Cardiovascular: negative for chest pain, edema or murmur  Gastrointestinal: as mentioned in HPI  Genito-Urinary: negative for dysuria or incontinence  Musculoskeletal: negative for joint pain, joint stiffness or joint swelling  Dermatological: negative for pruritus, rash, or jaundice    Current Medications  Current Outpatient Medications   Medication Sig Dispense Refill   • ALPRAZolam (XANAX) 0.5 mg tablet TAKE 1 TAB TWICE DAILY AS NEEDED  1   • famotidine (PEPCID) 40 MG tablet TAKE 1 TABLET BY MOUTH EVERY DAY 90 tablet 1   • Multiple Vitamin (MULTIVITAMIN) capsule Take 1 capsule by mouth daily     • sildenafil (VIAGRA) 100 mg tablet TAKE ONE TABLET BY MOUTH EVERY DAY AS NEEDED FOR ERECTILE DYSFUNCTION 30 tablet 3   • tadalafil (CIALIS) 20 MG tablet TAKE ONE TABLET BY MOUTH EVERY DAY AS NEEDED FOR ERECTILE DYSFUNCTION 30 tablet 3   • albuterol (PROVENTIL HFA,VENTOLIN HFA) 90 mcg/act inhaler   (Patient not taking: Reported on 1/6/2023)     • fluticasone-salmeterol (ADVAIR HFA) 115-21 MCG/ACT inhaler Inhale 2 puffs 2 (two) times a day Rinse mouth after use. (Patient not taking: Reported on 1/6/2023)     • guaifenesin-codeine (GUAIFENESIN AC) 100-10 MG/5ML liquid TAKE 1-2 TSP BY  MOUTH EVERY 4-6 HOURS AS NEEDED FOR COUGH (Patient not taking: Reported on 1/6/2023) 180 mL 0   • milk thistle 175 MG tablet Take 175 mg by mouth daily (Patient not taking: Reported on 10/11/2022)       No current facility-administered medications for this visit.       Past Medical History  Past Medical History:   Diagnosis Date   • Asthma    • Colon polyp    • COVID-19 11/2021   • GERD  (gastroesophageal reflux disease)    • IBS (irritable bowel syndrome)        Past Surgical History  Past Surgical History:   Procedure Laterality Date   • COLONOSCOPY     • KNEE SURGERY     • [place] COLONOSCOPY FLX DX W/COLLJ SPEC WHEN PFRMD N/A 3/20/2017    Procedure: COLONOSCOPY;  Surgeon: Maycol Weathers MD;  Location: [facility];  Service: Gastroenterology   • TONSILLECTOMY     • UPPER GASTROINTESTINAL ENDOSCOPY         Past Social History   Social History     Socioeconomic History   • Marital status: /Civil Union     Spouse name: None   • Number of children: None   • Years of education: None   • Highest education level: None   Occupational History   • Occupation: Retired   Tobacco Use   • Smoking status: Former     Current packs/day: 0.00     Average packs/day: 1 pack/day for 30.0 years (30.0 ttl pk-yrs)     Types: Cigarettes     Start date: 10/1986     Quit date: 10/2016     Years since quittin.8   • Smokeless tobacco: Never   Vaping Use   • Vaping status: Never Used   Substance and Sexual Activity   • Alcohol use: Yes     Alcohol/week: 2.0 standard drinks of alcohol     Types: 2 Cans of beer per week     Comment: daily scotch   • Drug use: No   • Sexual activity: Not Currently   Other Topics Concern   • None   Social History Narrative   • None     Social Determinants of Health     Financial Resource Strain: Not on file   Food Insecurity: No Food Insecurity (2021)    Hunger Vital Sign    • Worried About Running Out of Food in the Last Year: Never true    • Ran Out of Food in the Last Year: Never true   Transportation Needs: No Transportation Needs (2021)    PRAPARE - Transportation    • Lack of Transportation (Medical): No    • Lack of Transportation (Non-Medical): No   Physical Activity: Not on file   Stress: Not on file   Social Connections: Not on file   Intimate Partner Violence: Not on file   Housing Stability: Low Risk  (2021)    Housing Stability Vital Sign    • Unable to Pay  for Housing in the Last Year: No    • Number of Places Lived in the Last Year: 1    • Unstable Housing in the Last Year: No       Vital Signs  Vitals:    08/12/24 1342   BP: 138/78   BP Location: Left arm   Patient Position: Sitting   Cuff Size: Standard   Pulse: 72   SpO2: 97%   Weight: 108 kg (237 lb 6.4 oz)   Height: 6' (1.829 m)       Physical Exam:  General appearance: alert, cooperative, no distress  HEENT: normocephalic, anicteric, no eye erythema or discharge, no oropharyngeal thrush  Neck: supple  Lungs: CTA b/l, no rales, rhonchi, or wheezing, unlabored respirations  Heart: RRR, no murmur, rubs, or gallops  Abdomen: soft, non-tender, non-distended, normal bowel sounds, no masses or organomegaly  Rectal: deferred  Extremities: no cyanosis, clubbing, or edema  Musculoskeletal: normal gait  Skin: color and texture normal, no jaundice, no rashes or lesions  Psychiatric: alert and oriented, normal affect and behavior                                                          Martín Nolan PA-C

## 2025-03-24 ENCOUNTER — OFFICE VISIT (OUTPATIENT)
Dept: GASTROENTEROLOGY | Facility: AMBULARY SURGERY CENTER | Age: 60
End: 2025-03-24
Payer: COMMERCIAL

## 2025-03-24 VITALS
SYSTOLIC BLOOD PRESSURE: 140 MMHG | HEIGHT: 72 IN | OXYGEN SATURATION: 96 % | HEART RATE: 96 BPM | BODY MASS INDEX: 32.21 KG/M2 | WEIGHT: 237.8 LBS | DIASTOLIC BLOOD PRESSURE: 78 MMHG

## 2025-03-24 DIAGNOSIS — N52.8 OTHER MALE ERECTILE DYSFUNCTION: ICD-10-CM

## 2025-03-24 DIAGNOSIS — R19.8 IRREGULAR BOWEL HABITS: ICD-10-CM

## 2025-03-24 DIAGNOSIS — R74.01 ELEVATED TRANSAMINASE LEVEL: Primary | ICD-10-CM

## 2025-03-24 PROCEDURE — 99214 OFFICE O/P EST MOD 30 MIN: CPT | Performed by: PHYSICIAN ASSISTANT

## 2025-03-24 NOTE — PROGRESS NOTES
Name: Vernon Barrios      : 1965      MRN: [id number]  Encounter Provider: Papi Reyes PA-C  Encounter Date: 3/24/2025   Encounter department: [facility]  :  Assessment & Plan  Elevated transaminase level  Longstanding elevation of transaminases, patient with history of obesity and hyperlipidemia and alcohol use, suspect MASH.  Does not appear thrombocytopenic or with any other obvious stigmata of cirrhosis but should investigate further potential degree of liver fibrosis    -Check liver elastography    -Will check smooth muscle antibody, he had recently positive JAZZY of 1-80    -Check chronic hepatitis panel    -Check CMP, PT/INR and CBC  Orders:    CBC and differential; Future    Comprehensive metabolic panel; Future    Protime-INR; Future    Anti-smooth muscle antibody, IgG; Future    Chronic Hepatitis Panel; Future    US elastography; Future    Irregular bowel habits  Mild bowel irregularity, generally improved compared with previous evaluation, patient also had EGD and colonoscopy in  in the context of similar symptomatology.  Having some success with probiotics at this time, still with some sensation of incomplete evacuation and inconsistent stool consistency    -Suggested patient trial Metamucil for fiber supplementation, patient says he will try this    -Encouraged regular fluid intake/hydration and physical activity within the patient's capabilities    -Can follow-up as needed           History of Present Illness   Vernon Barrios is a 59 y.o. male former  with history of hyperlipidemia who presents for follow-up, he was last seen in our office a few months ago in August for follow-up of GERD which was well-managed on famotidine 40 mg as needed.  He also had some constipation issues and history of colon polyps, his previous EGD and colonoscopy were done in .  He was advised about trialing fiber supplementation at that time.    At  this time the patient says he is not on fiber but he did start probiotics 2 months ago, he is not having bowel movements about 3-4 times a day, the consistency varies though and he does still sometimes experience sensation of incomplete evacuation after evacuating.    He says he has been cutting down on alcohol intake, he guesses at this point that he averages about a 12 pack of beer a week.  No known history of cirrhosis, though he has longstanding elevations in his ALT and AST, most recently in December with ALT of 169 and AST of 185.  He had negative hepatitis B and C serologies checked in 2021 and he denies any history of IV drug use.  He had a positive JAZZY of 1-80 titer recently.  Platelet count in December appeared normal.    HPI    Review of Systems   Constitutional:  Negative for activity change, appetite change, chills, fatigue, fever and unexpected weight change.   HENT:  Negative for congestion, nosebleeds, rhinorrhea, sore throat and trouble swallowing.    Eyes:  Negative for pain, itching and visual disturbance.   Respiratory:  Negative for cough, shortness of breath and wheezing.    Cardiovascular:  Negative for chest pain, palpitations and leg swelling.   Gastrointestinal:  Negative for constipation, diarrhea, nausea and vomiting.   Endocrine: Negative for cold intolerance, heat intolerance and polyuria.   Genitourinary:  Negative for difficulty urinating, dysuria, flank pain, frequency and hematuria.   Musculoskeletal:  Negative for arthralgias, back pain, myalgias and neck pain.   Skin:  Negative for color change, pallor and rash.   Neurological:  Negative for dizziness, speech difficulty, weakness, numbness and headaches.   Hematological:  Does not bruise/bleed easily.   Psychiatric/Behavioral:  Negative for dysphoric mood and sleep disturbance. The patient is not nervous/anxious.     A complete review of systems is negative other than that noted above in the HPI.      Current Outpatient  Medications   Medication Sig Dispense Refill    albuterol (PROVENTIL HFA,VENTOLIN HFA) 90 mcg/act inhaler       ALPRAZolam (XANAX) 0.5 mg tablet TAKE 1 TAB TWICE DAILY AS NEEDED  1    famotidine (PEPCID) 40 MG tablet TAKE 1 TABLET BY MOUTH EVERY DAY (Patient taking differently: 2 (two) times a day as needed) 90 tablet 1    milk thistle 175 MG tablet Take 175 mg by mouth daily      Multiple Vitamin (MULTIVITAMIN) capsule Take 1 capsule by mouth daily      sildenafil (VIAGRA) 100 mg tablet TAKE ONE TABLET BY MOUTH EVERY DAY AS NEEDED FOR ERECTILE DYSFUNCTION 30 tablet 3    tadalafil (CIALIS) 20 MG tablet TAKE ONE TABLET BY MOUTH EVERY DAY AS NEEDED FOR ERECTILE DYSFUNCTION 30 tablet 3    fluticasone-salmeterol (ADVAIR HFA) 115-21 MCG/ACT inhaler Inhale 2 puffs 2 (two) times a day Rinse mouth after use. (Patient not taking: Reported on 1/6/2023)      guaifenesin-codeine (GUAIFENESIN AC) 100-10 MG/5ML liquid TAKE 1-2 TSP BY  MOUTH EVERY 4-6 HOURS AS NEEDED FOR COUGH (Patient not taking: Reported on 1/6/2023) 180 mL 0     No current facility-administered medications for this visit.     Objective   /78 (BP Location: Left arm, Patient Position: Sitting, Cuff Size: Standard)   Pulse 96   Ht 6' (1.829 m)   Wt 108 kg (237 lb 12.8 oz)   SpO2 96%   BMI 32.25 kg/m²     Physical Exam  Constitutional:       General: He is not in acute distress.     Appearance: He is well-developed. He is not diaphoretic.   HENT:      Head: Normocephalic and atraumatic.   Eyes:      Conjunctiva/sclera: Conjunctivae normal.      Pupils: Pupils are equal, round, and reactive to light.   Cardiovascular:      Rate and Rhythm: Normal rate and regular rhythm.      Heart sounds: Normal heart sounds. No murmur heard.     No friction rub. No gallop.   Pulmonary:      Effort: Pulmonary effort is normal. No respiratory distress.      Breath sounds: Normal breath sounds. No stridor. No wheezing or rales.   Abdominal:      General: Bowel sounds are  normal. There is no distension.      Palpations: Abdomen is soft. There is no mass.      Tenderness: There is no abdominal tenderness. There is no guarding or rebound.   Musculoskeletal:         General: No tenderness. Normal range of motion.      Cervical back: Normal range of motion and neck supple.   Skin:     General: Skin is warm and dry.      Coloration: Skin is not pale.      Findings: No erythema or rash.   Neurological:      Mental Status: He is alert and oriented to person, place, and time.   Psychiatric:         Behavior: Behavior normal.            Lab Results: I personally reviewed relevant lab results.       Results for orders placed during the hospital encounter of 03/01/22    EGD    Impression  1. Mild diffuse gastritis.    RECOMMENDATION:  There is no recommended follow-up for this procedure.  Follow-up biopsy results in 2 weeks.  Avoid NSAIDs.  Would recommend taking Pepcid on daily basis instead of as needed basis.  Eradicate H pylori if positive.  Advised to avoid fatty foods, chocolates, caffeine, alcohol and any other triggering foods.  Avoid eating for at least 3 hours before going to bed.          Osorio Chaudhari MD

## 2025-03-25 RX ORDER — TADALAFIL 20 MG/1
20 TABLET ORAL DAILY PRN
Qty: 30 TABLET | Refills: 3 | Status: SHIPPED | OUTPATIENT
Start: 2025-03-25

## 2025-03-25 RX ORDER — SILDENAFIL 100 MG/1
100 TABLET, FILM COATED ORAL DAILY PRN
Qty: 30 TABLET | Refills: 3 | Status: SHIPPED | OUTPATIENT
Start: 2025-03-25

## 2025-04-01 ENCOUNTER — RESULTS FOLLOW-UP (OUTPATIENT)
Dept: GASTROENTEROLOGY | Facility: AMBULARY SURGERY CENTER | Age: 60
End: 2025-04-01

## 2025-04-01 NOTE — TELEPHONE ENCOUNTER
Left generic VM, asking pt to call back (since pt's communication consent will not allow for VM consent).  If pt calls back, please advise results/recs.  Otherwise I will try again later/tomorrow.

## 2025-04-01 NOTE — TELEPHONE ENCOUNTER
Pt calling in for results, I reviewed with pt. He understood. He will have smooth muscle antibody test done.   He has US scheduled with  on 4/7 at 9:30am

## 2025-04-01 NOTE — RESULT ENCOUNTER NOTE
Please advise patient his liver enzymes are still elevated, though stable from previous.  He still appears to have ongoing inflammation/damage occurring in his liver and he should continue to decrease his alcohol intake.  He should have the smooth muscle antibody test done, which appears has not been drawn yet, and should also schedule the ultrasound elastography as discussed at his office visit.  Let us know if any concerns or questions in the meantime and otherwise we will continue to update as further results come back.

## 2025-04-03 LAB
ALBUMIN SERPL-MCNC: 4.6 G/DL (ref 3.5–5.7)
ALP SERPL-CCNC: 58 U/L (ref 35–120)
ALT SERPL-CCNC: 195 U/L
ANION GAP SERPL CALCULATED.3IONS-SCNC: 9 MMOL/L (ref 3–11)
AST SERPL-CCNC: 136 U/L
BASOPHILS # BLD AUTO: 0.1 THOU/CMM (ref 0–0.1)
BASOPHILS NFR BLD AUTO: 1 %
BILIRUB SERPL-MCNC: 1.5 MG/DL (ref 0.2–1)
BUN SERPL-MCNC: 19 MG/DL (ref 7–28)
CALCIUM SERPL-MCNC: 9.6 MG/DL (ref 8.5–10.5)
CHLORIDE SERPL-SCNC: 101 MMOL/L (ref 100–109)
CO2 SERPL-SCNC: 26 MMOL/L (ref 21–31)
CREAT SERPL-MCNC: 1.03 MG/DL (ref 0.53–1.3)
CYTOLOGY CMNT CVX/VAG CYTO-IMP: ABNORMAL
DIFFERENTIAL METHOD BLD: ABNORMAL
EOSINOPHIL # BLD AUTO: 0.1 THOU/CMM (ref 0–0.5)
EOSINOPHIL NFR BLD AUTO: 1 %
ERYTHROCYTE [DISTWIDTH] IN BLOOD BY AUTOMATED COUNT: 14.2 % (ref 12–16)
GFR/BSA.PRED SERPLBLD CYS-BASED-ARV: 82 ML/MIN/{1.73_M2}
GLUCOSE SERPL-MCNC: 104 MG/DL (ref 65–99)
HAV AB SER-IMP: NORMAL
HAV IGG+IGM SER QL: NONREACTIVE
HAV IGM SERPL QL IA: NONREACTIVE
HBV CORE AB SERPL QL IA: NONREACTIVE
HBV CORE IGM SERPL QL IA: NONREACTIVE
HBV SURFACE AB SERPL IA-ACNC: 67.9 MIU/ML
HBV SURFACE AG SERPL QL IA: NONREACTIVE
HCT VFR BLD AUTO: 46.7 % (ref 37–48)
HCV AB SERPL QL IA: NONREACTIVE
HGB BLD-MCNC: 16.1 G/DL (ref 12.5–17)
INR PPP: 1.1
LYMPHOCYTES # BLD AUTO: 1.9 THOU/CMM (ref 1–3)
LYMPHOCYTES NFR BLD AUTO: 19 %
MCH RBC QN AUTO: 33.4 PG (ref 27–36)
MCHC RBC AUTO-ENTMCNC: 34.5 G/DL (ref 32–37)
MCV RBC AUTO: 97 FL (ref 80–100)
MONOCYTES # BLD AUTO: 1.3 THOU/CMM (ref 0.3–1)
MONOCYTES NFR BLD AUTO: 13 %
NEUTROPHILS # BLD AUTO: 6.7 THOU/CMM (ref 1.8–7.8)
NEUTROPHILS NFR BLD AUTO: 66 %
PLATELET # BLD AUTO: 237 THOU/CMM (ref 140–350)
PMV BLD REES-ECKER: 8.7 FL (ref 7.5–11.3)
POTASSIUM SERPL-SCNC: 4.4 MMOL/L (ref 3.5–5.2)
PROT SERPL-MCNC: 8.1 G/DL (ref 6.3–8.3)
PROTHROMBIN TIME: 14.4 SEC (ref 12–14.6)
RBC # BLD AUTO: 4.84 MILL/CMM (ref 4–5.4)
REF LAB TEST REF RANGE: NORMAL
SMOOTH MUSCLE AB TITR SER IF: NEGATIVE TITER
SODIUM SERPL-SCNC: 136 MMOL/L (ref 135–145)
WBC # BLD AUTO: 10.2 THOU/CMM (ref 4–10.5)

## 2025-04-13 ENCOUNTER — HOSPITAL ENCOUNTER (OUTPATIENT)
Dept: ULTRASOUND IMAGING | Facility: HOSPITAL | Age: 60
Discharge: HOME | End: 2025-04-13
Payer: COMMERCIAL

## 2025-04-13 DIAGNOSIS — R74.01 ELEVATED TRANSAMINASE LEVEL: ICD-10-CM

## 2025-04-13 PROCEDURE — 76981 USE PARENCHYMA: CPT

## 2025-04-17 ENCOUNTER — RESULTS FOLLOW-UP (OUTPATIENT)
Dept: OTHER | Facility: HOSPITAL | Age: 60
End: 2025-04-17

## 2025-05-22 ENCOUNTER — TELEPHONE (OUTPATIENT)
Dept: GASTROENTEROLOGY | Facility: AMBULARY SURGERY CENTER | Age: 60
End: 2025-05-22

## 2025-05-22 NOTE — TELEPHONE ENCOUNTER
Leonor, with pt's PCP office, states pt informed  that he never received the results/recommendations for his US elastography from 04/13. Informed Leonor the results and recommendations were uploaded to pt's [employer] on 04/13 and pt reviewed on 04/21. Relayed the results to PCP's office. Leonor will inform  that pt reviewed provider's message.    Papi Reyes PA-C to Vernon Barrios  Outgoing  [facility] [employer] message    4/17/25  5:12 PM  Your elastography shows a significant degree of scarring within your liver, not quite enough to define it as cirrhosis as of yet but again we would strongly recommend decreasing and eventually eliminating alcohol intake to prevent this from developing into cirrhosis.  We would recommend checking this test again in a year to see if there has been any interval change.  Please let us know if you have any particular concerns or questions, thank you.    Last read by Vernon Barrios at 8:21PM on 4/21/2025.  US elastography